# Patient Record
Sex: MALE | Race: BLACK OR AFRICAN AMERICAN | Employment: FULL TIME | URBAN - METROPOLITAN AREA
[De-identification: names, ages, dates, MRNs, and addresses within clinical notes are randomized per-mention and may not be internally consistent; named-entity substitution may affect disease eponyms.]

---

## 2017-06-06 ENCOUNTER — HOSPITAL ENCOUNTER (EMERGENCY)
Age: 37
Discharge: HOME | End: 2017-06-06
Attending: EMERGENCY MEDICINE
Payer: MEDICARE

## 2017-06-06 VITALS
TEMPERATURE: 98.1 F | SYSTOLIC BLOOD PRESSURE: 172 MMHG | RESPIRATION RATE: 18 BRPM | OXYGEN SATURATION: 99 % | DIASTOLIC BLOOD PRESSURE: 107 MMHG | WEIGHT: 190 LBS | HEART RATE: 89 BPM | HEIGHT: 69 IN | BODY MASS INDEX: 28.14 KG/M2

## 2017-06-06 DIAGNOSIS — L73.9 FOLLICULITIS: Primary | ICD-10-CM

## 2017-06-06 PROCEDURE — 99282 EMERGENCY DEPT VISIT SF MDM: CPT

## 2017-06-06 RX ORDER — DOXYCYCLINE HYCLATE 100 MG
100 TABLET ORAL ONCE
Status: DISCONTINUED | OUTPATIENT
Start: 2017-06-06 | End: 2017-06-06

## 2017-06-06 RX ORDER — DOXYCYCLINE HYCLATE 100 MG
200 TABLET ORAL ONCE
Status: DISCONTINUED | OUTPATIENT
Start: 2017-06-06 | End: 2017-06-07 | Stop reason: HOSPADM

## 2017-06-06 RX ORDER — DOXYCYCLINE 100 MG/1
100 TABLET ORAL 2 TIMES DAILY
Qty: 20 TABLET | Refills: 0 | Status: SHIPPED | OUTPATIENT
Start: 2017-06-06 | End: 2017-06-16

## 2017-08-06 ENCOUNTER — HOSPITAL ENCOUNTER (EMERGENCY)
Age: 37
Discharge: HOME | End: 2017-08-06
Attending: EMERGENCY MEDICINE
Payer: MEDICARE

## 2017-08-06 VITALS
SYSTOLIC BLOOD PRESSURE: 163 MMHG | BODY MASS INDEX: 29.62 KG/M2 | RESPIRATION RATE: 16 BRPM | HEART RATE: 85 BPM | OXYGEN SATURATION: 96 % | HEIGHT: 69 IN | TEMPERATURE: 97.5 F | WEIGHT: 200 LBS | DIASTOLIC BLOOD PRESSURE: 105 MMHG

## 2017-08-06 DIAGNOSIS — L20.84 INTRINSIC ECZEMA: Primary | ICD-10-CM

## 2017-08-06 PROCEDURE — 6370000000 HC RX 637 (ALT 250 FOR IP): Performed by: EMERGENCY MEDICINE

## 2017-08-06 PROCEDURE — G0381 LEV 2 HOSP TYPE B ED VISIT: HCPCS

## 2017-08-06 RX ORDER — PREDNISONE 20 MG/1
60 TABLET ORAL ONCE
Status: COMPLETED | OUTPATIENT
Start: 2017-08-06 | End: 2017-08-06

## 2017-08-06 RX ORDER — PREDNISONE 20 MG/1
60 TABLET ORAL DAILY
Qty: 12 TABLET | Refills: 0 | Status: SHIPPED | OUTPATIENT
Start: 2017-08-06 | End: 2017-08-10

## 2017-08-06 RX ADMIN — PREDNISONE 60 MG: 20 TABLET ORAL at 21:08

## 2017-08-06 ASSESSMENT — PAIN DESCRIPTION - LOCATION: LOCATION: GENERALIZED

## 2017-08-06 ASSESSMENT — PAIN DESCRIPTION - PAIN TYPE: TYPE: CHRONIC PAIN

## 2017-08-06 ASSESSMENT — PAIN SCALES - GENERAL: PAINLEVEL_OUTOF10: 9

## 2017-08-06 ASSESSMENT — PAIN DESCRIPTION - FREQUENCY: FREQUENCY: CONTINUOUS

## 2018-02-14 ENCOUNTER — HOSPITAL ENCOUNTER (EMERGENCY)
Age: 38
Discharge: HOME | End: 2018-02-14
Attending: EMERGENCY MEDICINE

## 2018-02-14 ENCOUNTER — APPOINTMENT (OUTPATIENT)
Dept: GENERAL RADIOLOGY | Age: 38
End: 2018-02-14

## 2018-02-14 VITALS
SYSTOLIC BLOOD PRESSURE: 199 MMHG | RESPIRATION RATE: 16 BRPM | HEART RATE: 81 BPM | OXYGEN SATURATION: 98 % | WEIGHT: 185 LBS | DIASTOLIC BLOOD PRESSURE: 118 MMHG | TEMPERATURE: 97.3 F | BODY MASS INDEX: 27.32 KG/M2

## 2018-02-14 DIAGNOSIS — M54.6 ACUTE LEFT-SIDED THORACIC BACK PAIN: Primary | ICD-10-CM

## 2018-02-14 DIAGNOSIS — R52 PAIN: ICD-10-CM

## 2018-02-14 PROCEDURE — 6370000000 HC RX 637 (ALT 250 FOR IP): Performed by: EMERGENCY MEDICINE

## 2018-02-14 PROCEDURE — 96372 THER/PROPH/DIAG INJ SC/IM: CPT

## 2018-02-14 PROCEDURE — 72040 X-RAY EXAM NECK SPINE 2-3 VW: CPT

## 2018-02-14 PROCEDURE — 99283 EMERGENCY DEPT VISIT LOW MDM: CPT

## 2018-02-14 PROCEDURE — 72050 X-RAY EXAM NECK SPINE 4/5VWS: CPT

## 2018-02-14 PROCEDURE — 6360000002 HC RX W HCPCS: Performed by: EMERGENCY MEDICINE

## 2018-02-14 PROCEDURE — 72072 X-RAY EXAM THORAC SPINE 3VWS: CPT

## 2018-02-14 RX ORDER — KETOROLAC TROMETHAMINE 30 MG/ML
30 INJECTION, SOLUTION INTRAMUSCULAR; INTRAVENOUS ONCE
Status: DISCONTINUED | OUTPATIENT
Start: 2018-02-14 | End: 2018-02-14

## 2018-02-14 RX ORDER — IBUPROFEN 800 MG/1
800 TABLET ORAL EVERY 8 HOURS PRN
Qty: 30 TABLET | Refills: 0 | Status: SHIPPED | OUTPATIENT
Start: 2018-02-14 | End: 2020-01-12

## 2018-02-14 RX ORDER — KETOROLAC TROMETHAMINE 30 MG/ML
30 INJECTION, SOLUTION INTRAMUSCULAR; INTRAVENOUS ONCE
Status: COMPLETED | OUTPATIENT
Start: 2018-02-14 | End: 2018-02-14

## 2018-02-14 RX ORDER — LISINOPRIL 10 MG/1
5 TABLET ORAL DAILY
Qty: 15 TABLET | Refills: 0 | Status: SHIPPED | OUTPATIENT
Start: 2018-02-14 | End: 2020-02-03 | Stop reason: SDUPTHER

## 2018-02-14 RX ORDER — DIAZEPAM 5 MG/1
5 TABLET ORAL ONCE
Status: COMPLETED | OUTPATIENT
Start: 2018-02-14 | End: 2018-02-14

## 2018-02-14 RX ORDER — LISINOPRIL 10 MG/1
5 TABLET ORAL ONCE
Status: COMPLETED | OUTPATIENT
Start: 2018-02-14 | End: 2018-02-14

## 2018-02-14 RX ORDER — DIAZEPAM 5 MG/1
5 TABLET ORAL EVERY 8 HOURS PRN
Qty: 10 TABLET | Refills: 0 | Status: SHIPPED | OUTPATIENT
Start: 2018-02-14 | End: 2018-02-24

## 2018-02-14 RX ADMIN — DIAZEPAM 5 MG: 5 TABLET ORAL at 09:15

## 2018-02-14 RX ADMIN — KETOROLAC TROMETHAMINE 30 MG: 30 INJECTION, SOLUTION INTRAMUSCULAR; INTRAVENOUS at 09:24

## 2018-02-14 RX ADMIN — LISINOPRIL 5 MG: 10 TABLET ORAL at 09:33

## 2018-02-14 ASSESSMENT — PAIN SCALES - GENERAL
PAINLEVEL_OUTOF10: 9
PAINLEVEL_OUTOF10: 9

## 2018-02-14 ASSESSMENT — ENCOUNTER SYMPTOMS
COUGH: 0
SORE THROAT: 0
SHORTNESS OF BREATH: 0
COLOR CHANGE: 0
NAUSEA: 0
VOMITING: 0
BACK PAIN: 1
ABDOMINAL PAIN: 0

## 2018-02-14 NOTE — ED PROVIDER NOTES
There is no acute fracture or listhesis. Minimal anterior osteophyte formation is noted within the lower thoracic spine. No acute osseous abnormality or significant degenerative changes of the thoracic spine evident. ED BEDSIDE ULTRASOUND:   Not clinically indicated    LABS:  Labs Reviewed - No data to display    Not clinically indicated    EMERGENCY DEPARTMENT COURSE:   Vitals:    Vitals:    02/14/18 0859   BP: (!) 199/118   Pulse: 81   Resp: 16   Temp: 97.3 °F (36.3 °C)   SpO2: 98%   Weight: 185 lb (83.9 kg)     -------------------------  BP: (!) 199/118, Temp: 97.3 °F (36.3 °C), Pulse: 81, Resp: 16    hypertensive      FINAL IMPRESSION      1. Acute left-sided thoracic back pain          DISPOSITION/PLAN     D/C to home    PATIENT REFERRED TO:  [facility] ED  [address] [place] [address]  [phone]  Go to   As needed      DISCHARGE MEDICATIONS:  New Prescriptions    DIAZEPAM (VALIUM) 5 MG TABLET    Take 1 tablet by mouth every 8 hours as needed for Anxiety for up to 10 days.     IBUPROFEN (ADVIL;MOTRIN) 800 MG TABLET    Take 1 tablet by mouth every 8 hours as needed for Pain    LISINOPRIL (PRINIVIL) 10 MG TABLET    Take 0.5 tablets by mouth daily       (Please note that portions of this note were completed with a voice recognition program.  Efforts were made to edit the dictations but occasionally words are mis-transcribed.)    Cj Quiñonez MD  Attending Emergency Physician                      Cj Quiñonez MD  02/14/18 0753

## 2019-06-18 ENCOUNTER — HOSPITAL ENCOUNTER (EMERGENCY)
Age: 39
Discharge: HOME | End: 2019-06-18
Attending: EMERGENCY MEDICINE
Payer: COMMERCIAL

## 2019-06-18 ENCOUNTER — HOSPITAL ENCOUNTER (EMERGENCY)
Age: 39
Discharge: HOME | End: 2019-06-18
Attending: EMERGENCY MEDICINE

## 2019-06-18 VITALS
BODY MASS INDEX: 30.45 KG/M2 | TEMPERATURE: 98.2 F | OXYGEN SATURATION: 100 % | SYSTOLIC BLOOD PRESSURE: 160 MMHG | RESPIRATION RATE: 16 BRPM | DIASTOLIC BLOOD PRESSURE: 102 MMHG | HEIGHT: 69 IN | WEIGHT: 205.6 LBS | HEART RATE: 73 BPM

## 2019-06-18 DIAGNOSIS — Z53.21 PATIENT LEFT WITHOUT BEING SEEN: Primary | ICD-10-CM

## 2019-06-18 DIAGNOSIS — L30.9 ECZEMA, UNSPECIFIED TYPE: Primary | ICD-10-CM

## 2019-06-18 DIAGNOSIS — R03.0 ELEVATED BLOOD PRESSURE READING: ICD-10-CM

## 2019-06-18 PROCEDURE — 6360000002 HC RX W HCPCS: Performed by: PHYSICIAN ASSISTANT

## 2019-06-18 PROCEDURE — 99282 EMERGENCY DEPT VISIT SF MDM: CPT

## 2019-06-18 PROCEDURE — 96372 THER/PROPH/DIAG INJ SC/IM: CPT

## 2019-06-18 RX ORDER — PREDNISONE 10 MG/1
TABLET ORAL
Qty: 30 TABLET | Refills: 0 | Status: SHIPPED | OUTPATIENT
Start: 2019-06-19 | End: 2019-10-05

## 2019-06-18 RX ORDER — WATER / MINERAL OIL / WHITE PETROLATUM 16 OZ
CREAM TOPICAL
Qty: 1 PACKAGE | Refills: 0 | Status: SHIPPED | OUTPATIENT
Start: 2019-06-18 | End: 2021-04-19

## 2019-06-18 RX ORDER — DEXAMETHASONE SODIUM PHOSPHATE 10 MG/ML
10 INJECTION INTRAMUSCULAR; INTRAVENOUS ONCE
Status: COMPLETED | OUTPATIENT
Start: 2019-06-18 | End: 2019-06-18

## 2019-06-18 RX ADMIN — DEXAMETHASONE SODIUM PHOSPHATE 10 MG: 10 INJECTION INTRAMUSCULAR; INTRAVENOUS at 21:56

## 2019-06-18 ASSESSMENT — PAIN DESCRIPTION - PROGRESSION: CLINICAL_PROGRESSION: GRADUALLY WORSENING

## 2019-06-18 ASSESSMENT — PAIN DESCRIPTION - LOCATION: LOCATION: GENERALIZED;OTHER (COMMENT)

## 2019-06-18 ASSESSMENT — PAIN DESCRIPTION - PAIN TYPE: TYPE: ACUTE PAIN

## 2019-06-18 ASSESSMENT — PAIN SCALES - GENERAL: PAINLEVEL_OUTOF10: 8

## 2019-06-18 ASSESSMENT — PAIN DESCRIPTION - FREQUENCY: FREQUENCY: CONTINUOUS

## 2019-06-19 NOTE — ED NOTES
Patient drove self to ER c/o rash/eczema breakout t/o body. Patient states he used to see a dermatologist, but has not been to one in about 1 year. Patient sitting on bed, no distress noted at this time, call light within reach, and instructed to call out for assistance as needed.       Solange Singletary RN  06/18/19 [id number]

## 2019-06-19 NOTE — ED PROVIDER NOTES
eMERGENCY dEPARTMENT eNCOUnter   Independent Attestation     Pt Name: Luis Fernando Morton  MRN: [id number]  [place] 1980  Date of evaluation: 6/18/19     Luis Fernando Morton is a 45 y.o. male with CC: Pruritis (Generalized skin HX Eczema)      Based on the medical record the care appears appropriate. I was personally available for consultation in the Emergency Department.     Anali Byrd MD  Attending Emergency Physician                    Anali Byrd MD  06/18/19 [id number]

## 2019-06-19 NOTE — ED PROVIDER NOTES
[facility]  Emergency Department  Faculty Attestation     PERTINENT ATTENDING PHYSICIAN COMMENTS:    Did not evaluate the patient. LWBS before before patient roomed.     Lance Anderson MD  Attending Emergency  Physician    (Please note that portions of this note were completed with a voice recognition program.  Efforts were made to edit the dictations but occasionally words are mis-transcribed.)        Fallon Sequeira MD  06/18/19 1985

## 2019-06-19 NOTE — ED PROVIDER NOTES
reports that he does not use drugs. REVIEW OFSYSTEMS    (2-9 systems for level 4, 10 or more for level 5)   Review of Systems    Except as noted above the remainder of the review of systems was reviewed and negative. PHYSICAL EXAM    (up to 7 for level 4, 8 or more for level 5)     ED Triage Vitals   BP Temp Temp src Pulse Resp SpO2 Height Weight   06/18/19 2128 06/18/19 2128 -- 06/18/19 2128 06/18/19 2128 06/18/19 2128 06/18/19 2129 06/18/19 2129   (!) 160/102 98.2 °F (36.8 °C)  73 16 100 % 5' 9\" (1.753 m) 205 lb 9.6 oz (93.3 kg)      Physical Exam   Constitutional: He is oriented to person, place, and time. He appears well-developed. HENT:   Head: Normocephalic and atraumatic. Eyes: EOM are normal.   Neck: Normal range of motion. Neck supple. Cardiovascular: Normal rate and regular rhythm. Pulmonary/Chest: Effort normal and breath sounds normal.   Abdominal: Soft. Musculoskeletal: Normal range of motion. Neurological: He is alert and oriented to person, place, and time. Skin: Skin is warm. Rash ( Rashes consistent with eczema on the back and neck, face, antecubital fossa, behind the knees, legs, waist line) noted. Psychiatric: He has a normal mood and affect. His behavior is normal.               DIAGNOSTIC RESULTS     EKG: All EKG's are interpreted by the Emergency Department Physician who either signs or Co-signs this chart in the absence of a cardiologist.        RADIOLOGY:   Non-plain film images such as CT, Ultrasound and MRI are read by the radiologist. Plain radiographic images arevisualized and preliminarily interpreted by the emergency physician with the below findings:        Interpretation per the Radiologist below, if available at thetime of this note:          ED BEDSIDE ULTRASOUND:   Performed by ED Physician - none    LABS:  Labs Reviewed - No data to display    All other labs were within normal range or not returned as of this dictation.     EMERGENCY DEPARTMENT COURSE and DIFFERENTIAL DIAGNOSIS/MDM:   Vitals:    Vitals:    06/18/19 2128 06/18/19 2129   BP: (!) 160/102    Pulse: 73    Resp: 16    Temp: 98.2 °F (36.8 °C)    SpO2: 100%    Weight:  205 lb 9.6 oz (93.3 kg)   Height:  5' 9\" (1.753 m)     Patient given Decadron discharge home. Given prednisone, Aquaphor, Eucerin cream.  Refer to dermatology for his history of eczema. Pre-hypertension/Hypertension: The patient has been informed that they may have pre-hypertension or Hypertension based on a blood pressure reading in the emergency department. I recommend that the patient call the primary care provider listed on their discharge instructions or a physician of their choice this week to arrange follow up for further evaluation of possible pre-hypertension or Hypertension. CONSULTS:  None    PROCEDURES:  Procedures        FINAL IMPRESSION      1. Eczema, unspecified type    2. Elevated blood pressure reading          DISPOSITION/PLAN   DISPOSITION Decision To Discharge 06/18/2019 09:54:10 PM      PATIENTREFERRED TO:   Elan Parrish MD  [address] [place] [phone]    In 1 day        DISCHARGE MEDICATIONS:     New Prescriptions    MINERAL OIL-HYDROPHILIC PETROLATUM (AQUAPHOR) OINTMENT    Apply topically at night before bed    PREDNISONE (DELTASONE) 10 MG TABLET    Take 5 tablets for 2 days  Take 4 tablets for 2 days  Take 3 tablets for 2 days  Take 2 tablets for 2 days  Take 1 tablets for 2 days    SKIN PROTECTANTS, MISC. (EUCERIN) CREAM    Apply topically twice a day.            (Please note that portions of this note were completed with a voice recognition program.  Efforts were made to edit thedictations but occasionally words are mis-transcribed.)    CHRISTIN Vega PA-C  06/18/19 [id number]

## 2019-06-19 NOTE — ED PROVIDER NOTES
Did not see or evaluate the patient, the patient eloped prior to me seeing him.      Jacque Rick,   06/18/19 9323

## 2019-08-06 ENCOUNTER — HOSPITAL ENCOUNTER (EMERGENCY)
Age: 39
Discharge: HOME | End: 2019-08-06
Attending: EMERGENCY MEDICINE
Payer: COMMERCIAL

## 2019-08-06 VITALS
BODY MASS INDEX: 28.14 KG/M2 | HEART RATE: 87 BPM | OXYGEN SATURATION: 100 % | DIASTOLIC BLOOD PRESSURE: 97 MMHG | RESPIRATION RATE: 18 BRPM | TEMPERATURE: 98.3 F | SYSTOLIC BLOOD PRESSURE: 175 MMHG | HEIGHT: 69 IN | WEIGHT: 190 LBS

## 2019-08-06 DIAGNOSIS — L30.9 ECZEMA, UNSPECIFIED TYPE: Primary | ICD-10-CM

## 2019-08-06 PROCEDURE — 96372 THER/PROPH/DIAG INJ SC/IM: CPT

## 2019-08-06 PROCEDURE — 6360000002 HC RX W HCPCS: Performed by: EMERGENCY MEDICINE

## 2019-08-06 PROCEDURE — 99282 EMERGENCY DEPT VISIT SF MDM: CPT

## 2019-08-06 RX ORDER — PREDNISONE 50 MG/1
50 TABLET ORAL DAILY
Qty: 5 TABLET | Refills: 0 | Status: SHIPPED | OUTPATIENT
Start: 2019-08-06 | End: 2019-08-11

## 2019-08-06 RX ORDER — CLOBETASOL PROPIONATE 0.5 MG/G
OINTMENT TOPICAL
Qty: 60 G | Refills: 3 | Status: SHIPPED | OUTPATIENT
Start: 2019-08-06 | End: 2020-02-03 | Stop reason: SDUPTHER

## 2019-08-06 RX ORDER — DEXAMETHASONE SODIUM PHOSPHATE 10 MG/ML
10 INJECTION INTRAMUSCULAR; INTRAVENOUS ONCE
Status: COMPLETED | OUTPATIENT
Start: 2019-08-06 | End: 2019-08-06

## 2019-08-06 RX ADMIN — DEXAMETHASONE SODIUM PHOSPHATE 10 MG: 10 INJECTION INTRAMUSCULAR; INTRAVENOUS at 20:05

## 2019-08-07 NOTE — ED PROVIDER NOTES
pertinent surgical history. FAMILY HISTORY           Problem Relation Age of Onset    Cancer Mother         colon cancer-  54y/o    Diabetes Maternal Grandmother      Family Status   Relation Name Status    Mother  (Not Specified)    MGM  (Not Specified)        SOCIAL HISTORY      reports that he has been smoking cigars and cigarettes. He has a 10.00 pack-year smoking history. He has never used smokeless tobacco. He reports that he drinks alcohol. He reports that he does not use drugs. REVIEW OF SYSTEMS    (2-9 systems for level 4, 10 or more for level 5)     Review of Systems   All other systems reviewed and are negative. Except as noted above the remainder of the review of systems was reviewed and negative. PHYSICAL EXAM    (up to 7 for level 4, 8 or more for level 5)     Vitals:    19   BP: (!) 175/97   Pulse: 87   Resp: 18   Temp: 98.3 °F (36.8 °C)   TempSrc: Oral   SpO2: 100%   Weight: 190 lb (86.2 kg)   Height: 5' 9\" (1.753 m)       Physical exam reflects a well-nourished well-hydrated male. He was hypertensive on arrival.  It is noted he has not had his blood pressure medications this evening. Vital signs otherwise stable to include pulse ox of 100% on room air. He is not hypoxic. He is alert conversive and appropriate in behavior. Integument demonstrates chronic rash particularly to the lower extremities. He has scarring as well as acute flare consistent with his psoriatic type rash. No open wounds or secondary infection. He does have darkening to the base of the neck although no active rash lesions. His lesions to the elbows. Heart regular rate and rhythm normal S1-S2 no murmurs rubs gallops. Lungs are clear to auscultation without wheezes rales or rhonchi. Abdomen is soft throughout.   No neurovascular deficits are noted      DIAGNOSTIC RESULTS       EMERGENCY DEPARTMENT COURSE and DIFFERENTIAL DIAGNOSIS/MDM:   Vitals:    Vitals:    19   BP: (!)

## 2019-10-05 ENCOUNTER — HOSPITAL ENCOUNTER (EMERGENCY)
Age: 39
Discharge: HOME | End: 2019-10-05
Attending: EMERGENCY MEDICINE
Payer: COMMERCIAL

## 2019-10-05 VITALS
HEIGHT: 69 IN | OXYGEN SATURATION: 100 % | HEART RATE: 85 BPM | DIASTOLIC BLOOD PRESSURE: 112 MMHG | BODY MASS INDEX: 29.62 KG/M2 | SYSTOLIC BLOOD PRESSURE: 168 MMHG | WEIGHT: 200 LBS | TEMPERATURE: 98.2 F | RESPIRATION RATE: 18 BRPM

## 2019-10-05 DIAGNOSIS — Z76.0 ENCOUNTER FOR MEDICATION REFILL: ICD-10-CM

## 2019-10-05 DIAGNOSIS — L30.9 ECZEMA, UNSPECIFIED TYPE: Primary | ICD-10-CM

## 2019-10-05 DIAGNOSIS — Z86.79 HISTORY OF HYPERTENSION: ICD-10-CM

## 2019-10-05 PROCEDURE — 99282 EMERGENCY DEPT VISIT SF MDM: CPT

## 2019-10-05 RX ORDER — AMLODIPINE BESYLATE 2.5 MG/1
2.5 TABLET ORAL DAILY
Qty: 14 TABLET | Refills: 0 | Status: SHIPPED | OUTPATIENT
Start: 2019-10-05 | End: 2020-01-12

## 2019-10-05 RX ORDER — PREDNISONE 10 MG/1
TABLET ORAL
Qty: 20 TABLET | Refills: 0 | Status: SHIPPED | OUTPATIENT
Start: 2019-10-05 | End: 2020-01-12

## 2019-10-05 ASSESSMENT — PAIN DESCRIPTION - PAIN TYPE: TYPE: ACUTE PAIN

## 2019-10-05 ASSESSMENT — ENCOUNTER SYMPTOMS
COUGH: 0
EYE PAIN: 0
SHORTNESS OF BREATH: 0
PHOTOPHOBIA: 0
ABDOMINAL PAIN: 0
COLOR CHANGE: 0

## 2019-10-05 ASSESSMENT — PAIN SCALES - GENERAL: PAINLEVEL_OUTOF10: 10

## 2019-10-05 ASSESSMENT — PAIN DESCRIPTION - ORIENTATION: ORIENTATION: RIGHT;LEFT

## 2019-10-05 ASSESSMENT — PAIN DESCRIPTION - DESCRIPTORS: DESCRIPTORS: BURNING

## 2019-12-24 ENCOUNTER — HOSPITAL ENCOUNTER (EMERGENCY)
Age: 39
Discharge: HOME | End: 2019-12-24
Attending: EMERGENCY MEDICINE
Payer: COMMERCIAL

## 2019-12-24 VITALS
SYSTOLIC BLOOD PRESSURE: 155 MMHG | HEIGHT: 69 IN | OXYGEN SATURATION: 97 % | HEART RATE: 105 BPM | BODY MASS INDEX: 30.36 KG/M2 | DIASTOLIC BLOOD PRESSURE: 103 MMHG | RESPIRATION RATE: 14 BRPM | WEIGHT: 205 LBS | TEMPERATURE: 97.9 F

## 2019-12-24 DIAGNOSIS — M54.50 ACUTE EXACERBATION OF CHRONIC LOW BACK PAIN: Primary | ICD-10-CM

## 2019-12-24 DIAGNOSIS — G89.29 ACUTE EXACERBATION OF CHRONIC LOW BACK PAIN: Primary | ICD-10-CM

## 2019-12-24 PROCEDURE — 99283 EMERGENCY DEPT VISIT LOW MDM: CPT

## 2019-12-24 PROCEDURE — 6360000002 HC RX W HCPCS: Performed by: EMERGENCY MEDICINE

## 2019-12-24 PROCEDURE — 96372 THER/PROPH/DIAG INJ SC/IM: CPT

## 2019-12-24 RX ORDER — KETOROLAC TROMETHAMINE 30 MG/ML
60 INJECTION, SOLUTION INTRAMUSCULAR; INTRAVENOUS ONCE
Status: COMPLETED | OUTPATIENT
Start: 2019-12-24 | End: 2019-12-24

## 2019-12-24 RX ORDER — IBUPROFEN 800 MG/1
800 TABLET ORAL 2 TIMES DAILY PRN
Qty: 25 TABLET | Refills: 1 | Status: SHIPPED | OUTPATIENT
Start: 2019-12-24 | End: 2020-01-12

## 2019-12-24 RX ORDER — PREDNISONE 20 MG/1
60 TABLET ORAL ONCE
Status: DISCONTINUED | OUTPATIENT
Start: 2019-12-24 | End: 2019-12-24 | Stop reason: HOSPADM

## 2019-12-24 RX ORDER — METHYLPREDNISOLONE 4 MG/1
TABLET ORAL
Qty: 1 KIT | Refills: 0 | Status: SHIPPED | OUTPATIENT
Start: 2019-12-24 | End: 2020-01-12

## 2019-12-24 RX ORDER — CYCLOBENZAPRINE HCL 10 MG
10 TABLET ORAL NIGHTLY PRN
Qty: 10 TABLET | Refills: 0 | Status: SHIPPED | OUTPATIENT
Start: 2019-12-24 | End: 2020-01-03

## 2019-12-24 RX ADMIN — KETOROLAC TROMETHAMINE 60 MG: 30 INJECTION, SOLUTION INTRAMUSCULAR at 09:19

## 2019-12-24 ASSESSMENT — ENCOUNTER SYMPTOMS
ABDOMINAL DISTENTION: 0
CHEST TIGHTNESS: 0
EYE PAIN: 0
ABDOMINAL PAIN: 0
EYE DISCHARGE: 0
SHORTNESS OF BREATH: 0
BACK PAIN: 1
FACIAL SWELLING: 0

## 2019-12-24 ASSESSMENT — PAIN SCALES - GENERAL
PAINLEVEL_OUTOF10: 10
PAINLEVEL_OUTOF10: 10

## 2019-12-24 ASSESSMENT — PAIN DESCRIPTION - PAIN TYPE: TYPE: ACUTE PAIN

## 2020-01-12 ENCOUNTER — HOSPITAL ENCOUNTER (EMERGENCY)
Age: 40
Discharge: HOME | End: 2020-01-12
Attending: EMERGENCY MEDICINE
Payer: COMMERCIAL

## 2020-01-12 VITALS
SYSTOLIC BLOOD PRESSURE: 178 MMHG | WEIGHT: 210 LBS | DIASTOLIC BLOOD PRESSURE: 110 MMHG | TEMPERATURE: 98 F | BODY MASS INDEX: 31.1 KG/M2 | HEART RATE: 95 BPM | RESPIRATION RATE: 20 BRPM | HEIGHT: 69 IN | OXYGEN SATURATION: 96 %

## 2020-01-12 PROCEDURE — 6360000002 HC RX W HCPCS: Performed by: PHYSICIAN ASSISTANT

## 2020-01-12 PROCEDURE — 99283 EMERGENCY DEPT VISIT LOW MDM: CPT

## 2020-01-12 PROCEDURE — 96372 THER/PROPH/DIAG INJ SC/IM: CPT

## 2020-01-12 RX ORDER — NAPROXEN 500 MG/1
500 TABLET ORAL 2 TIMES DAILY WITH MEALS
Qty: 20 TABLET | Refills: 0 | Status: SHIPPED | OUTPATIENT
Start: 2020-01-12 | End: 2020-01-12

## 2020-01-12 RX ORDER — METHOCARBAMOL 750 MG/1
750 TABLET, FILM COATED ORAL 4 TIMES DAILY
Qty: 40 TABLET | Refills: 0 | Status: SHIPPED | OUTPATIENT
Start: 2020-01-12 | End: 2020-01-22

## 2020-01-12 RX ORDER — KETOROLAC TROMETHAMINE 30 MG/ML
60 INJECTION, SOLUTION INTRAMUSCULAR; INTRAVENOUS ONCE
Status: COMPLETED | OUTPATIENT
Start: 2020-01-12 | End: 2020-01-12

## 2020-01-12 RX ORDER — PREDNISONE 20 MG/1
20 TABLET ORAL 2 TIMES DAILY
Qty: 10 TABLET | Refills: 0 | Status: SHIPPED | OUTPATIENT
Start: 2020-01-12 | End: 2020-01-17

## 2020-01-12 RX ORDER — ORPHENADRINE CITRATE 30 MG/ML
60 INJECTION INTRAMUSCULAR; INTRAVENOUS ONCE
Status: COMPLETED | OUTPATIENT
Start: 2020-01-12 | End: 2020-01-12

## 2020-01-12 RX ADMIN — KETOROLAC TROMETHAMINE 60 MG: 30 INJECTION, SOLUTION INTRAMUSCULAR at 23:40

## 2020-01-12 RX ADMIN — ORPHENADRINE CITRATE 60 MG: 30 INJECTION INTRAMUSCULAR; INTRAVENOUS at 23:40

## 2020-01-12 ASSESSMENT — PAIN SCALES - GENERAL
PAINLEVEL_OUTOF10: 10
PAINLEVEL_OUTOF10: 10

## 2020-01-12 ASSESSMENT — PAIN DESCRIPTION - LOCATION: LOCATION: BACK

## 2020-01-12 ASSESSMENT — PAIN DESCRIPTION - DESCRIPTORS: DESCRIPTORS: BURNING;STABBING;ACHING

## 2020-01-13 NOTE — ED PROVIDER NOTES
other labs were within normal range or not returned as of this dictation. EMERGENCY DEPARTMENT COURSE and DIFFERENTIAL DIAGNOSIS/MDM:   Vitals:    Vitals:    01/12/20 2250   BP: (!) 178/110   Pulse: 95   Resp: 20   Temp: 98 °F (36.7 °C)   TempSrc: Oral   SpO2: 96%   Weight: 210 lb (95.3 kg)   Height: 5' 9\" (1.753 m)     X-rays discussed with patient declined at this time. Patient given Toradol and Norflex and discharged home with work note, prednisone and Robaxin. Patient requesting prednisone. Pre-hypertension/Hypertension: The patient has been informed that they may have pre-hypertension or Hypertension based on a blood pressure reading in the emergency department. I recommend that the patient call the primary care provider listed on their discharge instructions or a physician of their choice this week to arrange follow up for further evaluation of possible pre-hypertension or Hypertension. CONSULTS:  None    PROCEDURES:  Procedures        FINAL IMPRESSION      1. Strain of lumbar region, initial encounter    2. Elevated blood pressure reading          DISPOSITION/PLAN   DISPOSITION Decision To Discharge 01/12/2020 11:35:44 PM      PATIENTREFERRED TO:   No follow-up provider specified.     DISCHARGE MEDICATIONS:     Discharge Medication List as of 1/12/2020 11:47 PM      START taking these medications    Details   methocarbamol (ROBAXIN-750) 750 MG tablet Take 1 tablet by mouth 4 times daily for 10 days, Disp-40 tablet, R-0Print                 (Please note that portions of this note were completed with a voice recognition program.  Efforts were made to edit thedictations but occasionally words are mis-transcribed.)    CHRISTIN [place] PA-C  01/12/20 Lynnette Cantu PA-C  01/13/20 [phone]

## 2020-01-13 NOTE — ED TRIAGE NOTES
Arrives ambulatory with slow gait, c/o low back pain in coccyx area, increased pain with position change. 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

## 2020-01-13 NOTE — ED NOTES
Pt presents to the ED via private auto for lower back pain. Pt state states the pain has been getting worse since Friday 1/10. Pt states the only thing different is that he has been lifting heavy packages. Pt states the pain is a 10/10. Pt has been taking muscle relaxers at home with no relief.       Chapin Gerard RN  01/12/20 7412

## 2020-02-03 ENCOUNTER — OFFICE VISIT (OUTPATIENT)
Dept: FAMILY MEDICINE CLINIC | Age: 40
End: 2020-02-03
Payer: COMMERCIAL

## 2020-02-03 VITALS
BODY MASS INDEX: 31.84 KG/M2 | DIASTOLIC BLOOD PRESSURE: 93 MMHG | HEIGHT: 69 IN | SYSTOLIC BLOOD PRESSURE: 145 MMHG | WEIGHT: 215 LBS | HEART RATE: 85 BPM

## 2020-02-03 PROCEDURE — 99211 OFF/OP EST MAY X REQ PHY/QHP: CPT

## 2020-02-03 PROCEDURE — 99213 OFFICE O/P EST LOW 20 MIN: CPT

## 2020-02-03 RX ORDER — CLOBETASOL PROPIONATE 0.5 MG/G
OINTMENT TOPICAL
Qty: 60 G | Refills: 3 | Status: SHIPPED | OUTPATIENT
Start: 2020-02-03 | End: 2021-04-19

## 2020-02-03 RX ORDER — AMLODIPINE BESYLATE 5 MG/1
5 TABLET ORAL DAILY
Qty: 90 TABLET | Refills: 2 | Status: SHIPPED | OUTPATIENT
Start: 2020-02-03 | End: 2020-11-23 | Stop reason: SDUPTHER

## 2020-02-03 RX ORDER — IBUPROFEN 600 MG/1
600 TABLET ORAL 4 TIMES DAILY PRN
Qty: 360 TABLET | Refills: 1 | OUTPATIENT
Start: 2020-02-03 | End: 2021-03-01

## 2020-02-03 RX ORDER — LISINOPRIL 10 MG/1
20 TABLET ORAL DAILY
Qty: 60 TABLET | Refills: 1 | Status: SHIPPED | OUTPATIENT
Start: 2020-02-03 | End: 2020-11-23 | Stop reason: SDUPTHER

## 2020-02-03 ASSESSMENT — ENCOUNTER SYMPTOMS
BACK PAIN: 1
COLOR CHANGE: 1
APNEA: 0
ABDOMINAL PAIN: 0
PHOTOPHOBIA: 0
VOMITING: 0
CONSTIPATION: 0
CHOKING: 0
WHEEZING: 0
NAUSEA: 0
DIARRHEA: 0
COUGH: 0
SHORTNESS OF BREATH: 0

## 2020-02-03 ASSESSMENT — PATIENT HEALTH QUESTIONNAIRE - PHQ9
SUM OF ALL RESPONSES TO PHQ QUESTIONS 1-9: 0
1. LITTLE INTEREST OR PLEASURE IN DOING THINGS: 0
SUM OF ALL RESPONSES TO PHQ9 QUESTIONS 1 & 2: 0
SUM OF ALL RESPONSES TO PHQ QUESTIONS 1-9: 0
2. FEELING DOWN, DEPRESSED OR HOPELESS: 0

## 2020-02-03 NOTE — PROGRESS NOTES
Attending Physician Statement  I have discussed the care of Geraldine Brumfield, 44 y.o. male,including pertinent history and exam findings,  with the resident Dr. Jerrod Riddle MD.  History:  Chief Complaint   Patient presents with    New Patient     pt. here to est care and HTN     Hypertension    Lower Back Pain    Rash     Patient is here to establish care. Past medical history is remarkable for hypertension, back pain and eczema. I have reviewed the key elements of the encounter with the resident. Examination was done by resident as documented in residents note. BP Readings from Last 3 Encounters:   02/03/20 (!) 145/93   01/12/20 (!) 178/110   12/24/19 (!) 155/103     BP (!) 145/93 (Site: Left Upper Arm, Position: Sitting, Cuff Size: Large Adult)   Pulse 85   Ht 5' 9\" (1.753 m)   Wt 215 lb (97.5 kg)   BMI 31.75 kg/m²   Lab Results   Component Value Date    WBC 13.1 (H) 12/06/2013    HGB 14.4 12/06/2013    HCT 42.3 12/06/2013     12/06/2013    ALT 22 12/06/2013    AST 22 12/06/2013     12/06/2013    K 3.9 12/06/2013     (H) 12/06/2013    CREATININE 0.87 12/06/2013    BUN 11 12/06/2013    CO2 27 12/06/2013     Lab Results   Component Value Date    CALCIUM 9.7 12/06/2013    PHOS 3.9 10/07/2011     No results found for: LDLCALC, LDLCHOLESTEROL, LDLDIRECT  I agree with the assessment, plan and diagnosis of    Diagnosis Orders   1. Essential hypertension  amLODIPine (NORVASC) 5 MG tablet    lisinopril (PRINIVIL) 10 MG tablet   2. Eczema, unspecified type  clobetasol (TEMOVATE) 0.05 % ointment    ADOLFO - Nilsa Villanueva MD, Dermatology, [place]   3. Lumbar herniated disc  ibuprofen (ADVIL;MOTRIN) 600 MG tablet   4. Diabetes mellitus screening  Microalbumin, Ur    Hemoglobin A1C   5. Screening for HIV (human immunodeficiency virus)  HIV Screen   6. Lipid screening  Lipid, Fasting     I agree with  orders as documented by the resident.   Recommendations:   Patient was counseled, medications regimen reviewed and Amlodipine dose increased, refills provided for Amlodipine and Lisinopril and follow up recommended. Health maintenance updated including lipid panel, BMP, urine for micro albumin and HIV screen. Management of back pain and eczema discussed and referral provided to dermatology. Return in about 1 month (around 3/3/2020) for HTN, lab work, back pain f/u.    ([address] ) Dr. Fidelia Morales MD

## 2020-02-03 NOTE — PROGRESS NOTES
Subjective:    Valentino Fava is a 44 y.o. male with  has a past medical history of Back pain, Diverticulitis, Eczema, HTN (hypertension), and Psoriasis. Presented to the office today for:  Chief Complaint   Patient presents with    New Patient     pt. here to est care and HTN     Hypertension    Lower Back Pain    Rash       HPI  80-year-old male came to the clinic to establish care. Patient has a past medical history of essential hypertension, eczema, herniated lumbar disc with back pain. Today patient's blood pressure is 145/93 on repeat. He is currently on lisinopril and Norvasc 2.5 I have increased his dose to 5 mg daily for the Norvasc and told the patient to keep a log of his blood pressure reading. For his eczema I refilled his clobetasol and gave him referral for dermatology. For the back pain I prescribed NSAIDs and will give him some information regarding physical therapy. I would also feel his FM LA form and I talked to the patient regarding that this has to be short-term in the meantime we can have him work with physical therapy and see how things go. Review of Systems   Constitutional: Negative for activity change, appetite change, chills, diaphoresis and fatigue. HENT: Negative for congestion. Eyes: Negative for photophobia and visual disturbance. Respiratory: Negative for apnea, cough, choking, shortness of breath and wheezing. Cardiovascular: Negative for chest pain, palpitations and leg swelling. Gastrointestinal: Negative for abdominal pain, constipation, diarrhea, nausea and vomiting. Endocrine: Negative for polyuria. Genitourinary: Negative for dysuria and hematuria. Musculoskeletal: Positive for back pain. Skin: Positive for color change and rash. Neurological: Negative for dizziness and weakness. Psychiatric/Behavioral: Negative for agitation, behavioral problems and confusion.                  The patient has a   Family History   Problem Relation 112 (H) 12/06/2013    CREATININE 0.87 12/06/2013    BUN 11 12/06/2013    CO2 27 12/06/2013     Lab Results   Component Value Date    CALCIUM 9.7 12/06/2013    PHOS 3.9 10/07/2011     No results found for: LDLCALC, LDLCHOLESTEROL, LDLDIRECT    Assessment and Plan:    1. Essential hypertension  - amLODIPine (NORVASC) 5 MG tablet; Take 1 tablet by mouth daily  Dispense: 90 tablet; Refill: 2  - lisinopril (PRINIVIL) 10 MG tablet; Take 2 tablets by mouth daily  Dispense: 60 tablet; Refill: 1    2. Eczema, unspecified type  - clobetasol (TEMOVATE) 0.05 % ointment; Apply topically 2 times daily. Dispense: 60 g; Refill: 3  - AFL - Mu Glies MD, Dermatology, [facility]    3. Lumbar herniated disc  - ibuprofen (ADVIL;MOTRIN) 600 MG tablet; Take 1 tablet by mouth 4 times daily as needed for Pain  Dispense: 360 tablet; Refill: 1    4. Diabetes mellitus screening  - Microalbumin, Ur; Future  - Hemoglobin A1C; Future    5. Screening for HIV (human immunodeficiency virus)  - HIV Screen; Future    6. Lipid screening  - Lipid, Fasting; Future          Requested Prescriptions     Signed Prescriptions Disp Refills    amLODIPine (NORVASC) 5 MG tablet 90 tablet 2     Sig: Take 1 tablet by mouth daily    clobetasol (TEMOVATE) 0.05 % ointment 60 g 3     Sig: Apply topically 2 times daily.  lisinopril (PRINIVIL) 10 MG tablet 60 tablet 1     Sig: Take 2 tablets by mouth daily    ibuprofen (ADVIL;MOTRIN) 600 MG tablet 360 tablet 1     Sig: Take 1 tablet by mouth 4 times daily as needed for Pain       Medications Discontinued During This Encounter   Medication Reason    amLODIPine (NORVASC) 2.5 MG tablet DOSE ADJUSTMENT    clobetasol (TEMOVATE) 0.05 % ointment REORDER    lisinopril (PRINIVIL) 10 MG tablet REORDER       Dinesh received counseling on the following healthy behaviors: nutrition, exercise and medication adherence    Discussed use,benefit, and side effects of prescribed medications.   Barriers to medication compliance addressed. All patient questions answered. Pt voiced understanding. No follow-ups on file. Disclaimer: Some orall of this note was transcribed using voice-recognition software. This may cause typographical errors occasionally. Although all effort is made to fix these errors, please do not hesitate to contact our office if there Tono Shaffer concern with the understanding of this note.

## 2020-06-11 ENCOUNTER — HOSPITAL ENCOUNTER (EMERGENCY)
Age: 40
Discharge: HOME | End: 2020-06-11
Attending: EMERGENCY MEDICINE
Payer: COMMERCIAL

## 2020-06-11 VITALS
RESPIRATION RATE: 16 BRPM | HEIGHT: 67 IN | TEMPERATURE: 98.7 F | DIASTOLIC BLOOD PRESSURE: 87 MMHG | SYSTOLIC BLOOD PRESSURE: 188 MMHG | OXYGEN SATURATION: 99 % | BODY MASS INDEX: 34.46 KG/M2 | WEIGHT: 219.56 LBS | HEART RATE: 82 BPM

## 2020-06-11 LAB
ABSOLUTE EOS #: 0.18 K/UL (ref 0–0.44)
ABSOLUTE IMMATURE GRANULOCYTE: 0.04 K/UL (ref 0–0.3)
ABSOLUTE LYMPH #: 2.69 K/UL (ref 1.1–3.7)
ABSOLUTE MONO #: 0.62 K/UL (ref 0.1–1.2)
ALBUMIN SERPL-MCNC: 4.2 G/DL (ref 3.5–5.2)
ALBUMIN/GLOBULIN RATIO: ABNORMAL (ref 1–2.5)
ALP BLD-CCNC: 85 U/L (ref 40–129)
ALT SERPL-CCNC: 21 U/L (ref 5–41)
AMYLASE: 68 U/L (ref 28–100)
ANION GAP SERPL CALCULATED.3IONS-SCNC: 13 MMOL/L (ref 9–17)
AST SERPL-CCNC: 23 U/L
BASOPHILS # BLD: 0 % (ref 0–2)
BASOPHILS ABSOLUTE: 0.04 K/UL (ref 0–0.2)
BILIRUB SERPL-MCNC: 0.12 MG/DL (ref 0.3–1.2)
BILIRUBIN DIRECT: <0.08 MG/DL
BILIRUBIN, INDIRECT: ABNORMAL MG/DL (ref 0–1)
BUN BLDV-MCNC: 10 MG/DL (ref 6–20)
BUN/CREAT BLD: 14 (ref 9–20)
CALCIUM SERPL-MCNC: 9.5 MG/DL (ref 8.6–10.4)
CHLORIDE BLD-SCNC: 105 MMOL/L (ref 98–107)
CO2: 23 MMOL/L (ref 20–31)
CREAT SERPL-MCNC: 0.74 MG/DL (ref 0.7–1.2)
DIFFERENTIAL TYPE: ABNORMAL
EOSINOPHILS RELATIVE PERCENT: 2 % (ref 1–4)
GFR AFRICAN AMERICAN: >60 ML/MIN
GFR NON-AFRICAN AMERICAN: >60 ML/MIN
GFR SERPL CREATININE-BSD FRML MDRD: NORMAL ML/MIN/{1.73_M2}
GFR SERPL CREATININE-BSD FRML MDRD: NORMAL ML/MIN/{1.73_M2}
GLOBULIN: ABNORMAL G/DL (ref 1.5–3.8)
GLUCOSE BLD-MCNC: 90 MG/DL (ref 70–99)
HCT VFR BLD CALC: 43 % (ref 40.7–50.3)
HEMOGLOBIN: 14.2 G/DL (ref 13–17)
IMMATURE GRANULOCYTES: 0 %
LIPASE: 29 U/L (ref 13–60)
LYMPHOCYTES # BLD: 22 % (ref 24–43)
MCH RBC QN AUTO: 30.9 PG (ref 25.2–33.5)
MCHC RBC AUTO-ENTMCNC: 33 G/DL (ref 28.4–34.8)
MCV RBC AUTO: 93.5 FL (ref 82.6–102.9)
MONOCYTES # BLD: 5 % (ref 3–12)
NRBC AUTOMATED: 0 PER 100 WBC
PDW BLD-RTO: 12.8 % (ref 11.8–14.4)
PLATELET # BLD: 264 K/UL (ref 138–453)
PLATELET ESTIMATE: ABNORMAL
PMV BLD AUTO: 10.2 FL (ref 8.1–13.5)
POTASSIUM SERPL-SCNC: 3.7 MMOL/L (ref 3.7–5.3)
RBC # BLD: 4.6 M/UL (ref 4.21–5.77)
RBC # BLD: ABNORMAL 10*6/UL
SEG NEUTROPHILS: 71 % (ref 36–65)
SEGMENTED NEUTROPHILS ABSOLUTE COUNT: 8.77 K/UL (ref 1.5–8.1)
SODIUM BLD-SCNC: 141 MMOL/L (ref 135–144)
TOTAL PROTEIN: 7.3 G/DL (ref 6.4–8.3)
WBC # BLD: 12.3 K/UL (ref 3.5–11.3)
WBC # BLD: ABNORMAL 10*3/UL

## 2020-06-11 PROCEDURE — 85025 COMPLETE CBC W/AUTO DIFF WBC: CPT

## 2020-06-11 PROCEDURE — 80076 HEPATIC FUNCTION PANEL: CPT

## 2020-06-11 PROCEDURE — 80048 BASIC METABOLIC PNL TOTAL CA: CPT

## 2020-06-11 PROCEDURE — 83690 ASSAY OF LIPASE: CPT

## 2020-06-11 PROCEDURE — 96374 THER/PROPH/DIAG INJ IV PUSH: CPT

## 2020-06-11 PROCEDURE — 6360000002 HC RX W HCPCS: Performed by: NURSE PRACTITIONER

## 2020-06-11 PROCEDURE — C9113 INJ PANTOPRAZOLE SODIUM, VIA: HCPCS | Performed by: NURSE PRACTITIONER

## 2020-06-11 PROCEDURE — 82150 ASSAY OF AMYLASE: CPT

## 2020-06-11 PROCEDURE — 2580000003 HC RX 258: Performed by: NURSE PRACTITIONER

## 2020-06-11 PROCEDURE — 99284 EMERGENCY DEPT VISIT MOD MDM: CPT

## 2020-06-11 RX ORDER — ONDANSETRON 2 MG/ML
4 INJECTION INTRAMUSCULAR; INTRAVENOUS ONCE
Status: COMPLETED | OUTPATIENT
Start: 2020-06-11 | End: 2020-06-11

## 2020-06-11 RX ORDER — PANTOPRAZOLE SODIUM 40 MG/10ML
40 INJECTION, POWDER, LYOPHILIZED, FOR SOLUTION INTRAVENOUS DAILY
Status: DISCONTINUED | OUTPATIENT
Start: 2020-06-11 | End: 2020-06-12 | Stop reason: HOSPADM

## 2020-06-11 RX ORDER — ONDANSETRON 4 MG/1
4 TABLET, ORALLY DISINTEGRATING ORAL 3 TIMES DAILY PRN
Qty: 21 TABLET | Refills: 0 | Status: SHIPPED | OUTPATIENT
Start: 2020-06-11 | End: 2020-08-20

## 2020-06-11 RX ORDER — 0.9 % SODIUM CHLORIDE 0.9 %
1000 INTRAVENOUS SOLUTION INTRAVENOUS ONCE
Status: COMPLETED | OUTPATIENT
Start: 2020-06-11 | End: 2020-06-11

## 2020-06-11 RX ADMIN — ONDANSETRON 4 MG: 2 INJECTION INTRAMUSCULAR; INTRAVENOUS at 20:51

## 2020-06-11 RX ADMIN — SODIUM CHLORIDE 1000 ML: 9 INJECTION, SOLUTION INTRAVENOUS at 20:51

## 2020-06-11 RX ADMIN — PANTOPRAZOLE SODIUM 40 MG: 40 INJECTION, POWDER, FOR SOLUTION INTRAVENOUS at 20:51

## 2020-06-11 ASSESSMENT — ENCOUNTER SYMPTOMS
VOMITING: 0
COUGH: 0
NAUSEA: 0
WHEEZING: 0
ABDOMINAL PAIN: 1
CONSTIPATION: 0
SORE THROAT: 0
SINUS PRESSURE: 0
COLOR CHANGE: 0
DIARRHEA: 0
RHINORRHEA: 0
SHORTNESS OF BREATH: 0

## 2020-06-11 ASSESSMENT — PAIN SCALES - GENERAL: PAINLEVEL_OUTOF10: 7

## 2020-06-11 NOTE — ED PROVIDER NOTES
EMERGENCY DEPARTMENT ENCOUNTER   ATTENDING ATTESTATION     Pt Name: Summer Hendricks  MRN: [id number]  [place] 1980  Date of evaluation: 6/11/20   Summer Hendricks is a 44 y.o. male with CC: Abdominal Pain (since tuesday); Emesis; and Diarrhea    MDM:   Patient is a 66-year-old male who presented to emergency department secondary to abdominal pain with nausea and vomiting. No acute findings laboratory analysis, discharged home with outpatient follow-up and given parameters to return to the emergency department. CRITICAL CARE:       EKG: All EKG's are interpreted by the Emergency Department Physician who either signs or Co-signs this chart in the absence of a cardiologist.      RADIOLOGY:All plain film, CT, MRI, and formal ultrasound images (except ED bedside ultrasound) are read by the radiologist, see reports below, unless otherwise noted in MDM or here. No orders to display     LABS: All lab results were reviewed by myself, and all abnormals are listed below. Labs Reviewed   CBC WITH AUTO DIFFERENTIAL - Abnormal; Notable for the following components:       Result Value    WBC 12.3 (*)     Seg Neutrophils 71 (*)     Lymphocytes 22 (*)     Segs Absolute 8.77 (*)     All other components within normal limits   HEPATIC FUNCTION PANEL - Abnormal; Notable for the following components: Total Bilirubin 0.12 (*)     All other components within normal limits   BASIC METABOLIC PANEL   LIPASE   AMYLASE     CONSULTS:  None  FINAL IMPRESSION    No diagnosis found. PASTMEDICAL HISTORY     Past Medical History:   Diagnosis Date    Back pain     on 12/24/19 pt denies pain mgmnt    Diverticulitis     Eczema     HTN (hypertension)     Psoriasis      SURGICAL HISTORY     History reviewed. No pertinent surgical history. CURRENT MEDICATIONS       Previous Medications    AMLODIPINE (NORVASC) 5 MG TABLET    Take 1 tablet by mouth daily    CLOBETASOL (TEMOVATE) 0.05 % OINTMENT    Apply topically 2 times daily. IBUPROFEN (ADVIL;MOTRIN) 600 MG TABLET    Take 1 tablet by mouth 4 times daily as needed for Pain    LISINOPRIL (PRINIVIL) 10 MG TABLET    Take 2 tablets by mouth daily    MINERAL OIL-HYDROPHILIC PETROLATUM (AQUAPHOR) OINTMENT    Apply topically at night before bed    SKIN PROTECTANTS, MISC. (EUCERIN) CREAM    Apply topically twice a day. ALLERGIES     has No Known Allergies. FAMILY HISTORY     He indicated that the status of his mother is unknown. He indicated that the status of his maternal grandmother is unknown. SOCIAL HISTORY       Social History     Tobacco Use    Smoking status: Current Every Day Smoker     Packs/day: 0.50     Years: 20.00     Pack years: 10.00     Types: Cigars, Cigarettes    Smokeless tobacco: Never Used   Substance Use Topics    Alcohol use: Yes     Comment: occasionally    Drug use: No       I personally evaluated and examined the patient in conjunction with the APC and agree with the assessment, treatment plan, and disposition of the patient as recorded by the APC.    Kj Maldonado MD  Attending Emergency Physician          Kj Maldonado MD  28/58/47 8783

## 2020-06-12 NOTE — ED PROVIDER NOTES
[facility] ED  eMERGENCY dEPARTMENT eNCOUnter      Pt Name: Fracisco Reinoso  MRN: [id number]  [place] 1980  Date of evaluation: 6/11/2020  Provider: Tiffanie Herrera NP, GIANNA Venegas 4024       Chief Complaint   Patient presents with    Abdominal Pain     since tuesday    Emesis    Diarrhea         HISTORY OF PRESENT ILLNESS  (Location/Symptom, Timing/Onset, Context/Setting, Quality, Duration, Modifying Factors, Severity.)   Fracisco Reinoso is a 44 y.o. male who presents to the emergency department by private vehicle for evaluation of nausea vomiting and diarrhea. Patient states that since Tuesday he has been experiencing epigastric abdominal pain with vomiting and diarrhea. He denies any hematemesis hematochezia. He denies any history of any GI or  problems in the past.  He denies fevers but has had some chills. States that he is even vomited a few times today. Nursing Notes were reviewed. ALLERGIES     Patient has no known allergies. CURRENT MEDICATIONS       Discharge Medication List as of 6/11/2020  9:51 PM      CONTINUE these medications which have NOT CHANGED    Details   amLODIPine (NORVASC) 5 MG tablet Take 1 tablet by mouth daily, Disp-90 tablet, R-2Normal      clobetasol (TEMOVATE) 0.05 % ointment Apply topically 2 times daily. , Disp-60 g, R-3, Normal      lisinopril (PRINIVIL) 10 MG tablet Take 2 tablets by mouth daily, Disp-60 tablet, R-1Normal      ibuprofen (ADVIL;MOTRIN) 600 MG tablet Take 1 tablet by mouth 4 times daily as needed for Pain, Disp-360 tablet, R-1Normal      Skin Protectants, Misc.  (EUCERIN) cream Apply topically twice a day., Disp-1 Package, R-0, Print      mineral oil-hydrophilic petrolatum (AQUAPHOR) ointment Apply topically at night before bed, Disp-396 g, R-0, Print             PAST MEDICAL HISTORY         Diagnosis Date    Back pain     on 12/24/19 pt denies pain mgmnt    Diverticulitis     Eczema     HTN (hypertension)     Psoriasis motion and neck supple. Cardiovascular:      Rate and Rhythm: Normal rate and regular rhythm. Pulmonary:      Effort: Pulmonary effort is normal. No respiratory distress. Breath sounds: Normal breath sounds. No stridor. Abdominal:      General: Bowel sounds are normal.      Palpations: Abdomen is soft. Tenderness: There is abdominal tenderness. Musculoskeletal: Normal range of motion. Lymphadenopathy:      Cervical: No cervical adenopathy. Skin:     General: Skin is warm and dry. Findings: No rash. Neurological:      Mental Status: He is alert and oriented to person, place, and time. LABS:  Labs Reviewed   CBC WITH AUTO DIFFERENTIAL - Abnormal; Notable for the following components:       Result Value    WBC 12.3 (*)     Seg Neutrophils 71 (*)     Lymphocytes 22 (*)     Segs Absolute 8.77 (*)     All other components within normal limits   HEPATIC FUNCTION PANEL - Abnormal; Notable for the following components: Total Bilirubin 0.12 (*)     All other components within normal limits   BASIC METABOLIC PANEL   LIPASE   AMYLASE       All other labs were within normal range or not returned as of this dictation. EMERGENCY DEPARTMENT COURSE and DIFFERENTIAL DIAGNOSIS/MDM:   Vitals:    Vitals:    06/11/20 1921   BP: (!) 188/87   Pulse: 82   Resp: 16   Temp: 98.7 °F (37.1 °C)   TempSrc: Oral   SpO2: 99%   Weight: 219 lb 9 oz (99.6 kg)   Height: 5' 7\" (1.702 m)       Medical Decision Making: Patient feels much better after IV fluids and medications. He is able be discharged home on oral Zofran. He was told to clear liquid diet for the next 24 hours. Follow-up with primary care physician. Return for worsening symptoms.   Medications   pantoprazole (PROTONIX) injection 40 mg (40 mg Intravenous Given 6/11/20 2051)   0.9 % sodium chloride bolus (0 mLs Intravenous Stopped 6/11/20 2151)   ondansetron (ZOFRAN) injection 4 mg (4 mg Intravenous Given 6/11/20 2051)     FINAL IMPRESSION

## 2020-08-20 ENCOUNTER — HOSPITAL ENCOUNTER (EMERGENCY)
Age: 40
Discharge: HOME | End: 2020-08-20
Attending: EMERGENCY MEDICINE
Payer: COMMERCIAL

## 2020-08-20 VITALS
DIASTOLIC BLOOD PRESSURE: 108 MMHG | OXYGEN SATURATION: 98 % | TEMPERATURE: 98.1 F | SYSTOLIC BLOOD PRESSURE: 172 MMHG | HEART RATE: 81 BPM | RESPIRATION RATE: 16 BRPM

## 2020-08-20 PROCEDURE — 6360000002 HC RX W HCPCS: Performed by: EMERGENCY MEDICINE

## 2020-08-20 PROCEDURE — 99282 EMERGENCY DEPT VISIT SF MDM: CPT

## 2020-08-20 PROCEDURE — 96372 THER/PROPH/DIAG INJ SC/IM: CPT

## 2020-08-20 RX ORDER — DEXAMETHASONE SODIUM PHOSPHATE 10 MG/ML
10 INJECTION INTRAMUSCULAR; INTRAVENOUS ONCE
Status: COMPLETED | OUTPATIENT
Start: 2020-08-20 | End: 2020-08-20

## 2020-08-20 RX ORDER — PREDNISONE 10 MG/1
TABLET ORAL
Qty: 30 TABLET | Refills: 0 | Status: SHIPPED | OUTPATIENT
Start: 2020-08-20 | End: 2020-10-25

## 2020-08-20 RX ADMIN — DEXAMETHASONE SODIUM PHOSPHATE 10 MG: 10 INJECTION INTRAMUSCULAR; INTRAVENOUS at 14:02

## 2020-08-20 ASSESSMENT — ENCOUNTER SYMPTOMS
ABDOMINAL PAIN: 0
DIARRHEA: 0
SHORTNESS OF BREATH: 0
EYE DISCHARGE: 0
COLOR CHANGE: 0
FACIAL SWELLING: 0
EYE REDNESS: 0
VOMITING: 0
CONSTIPATION: 0
COUGH: 0

## 2020-08-20 NOTE — ED PROVIDER NOTES
current alcohol use. He reports that he does not use drugs. REVIEW OF SYSTEMS    (2-9 systems for level 4, 10 or more for level 5)     Review of Systems   Constitutional: Negative for chills, fatigue and fever. HENT: Negative for congestion, ear discharge and facial swelling. Eyes: Negative for discharge and redness. Respiratory: Negative for cough and shortness of breath. Cardiovascular: Negative for chest pain. Gastrointestinal: Negative for abdominal pain, constipation, diarrhea and vomiting. Genitourinary: Negative for dysuria and hematuria. Musculoskeletal: Negative for arthralgias. Skin: Positive for rash. Negative for color change. Neurological: Negative for syncope, numbness and headaches. Hematological: Negative for adenopathy. Psychiatric/Behavioral: Negative for confusion. The patient is not nervous/anxious. Except as noted above the remainder of the review of systems was reviewed and negative. PHYSICAL EXAM    (up to 7 for level 4, 8 or more for level 5)     Vitals:    08/20/20 1334   BP: (!) 172/108   Pulse: 81   Resp: 16   Temp: 98.1 °F (36.7 °C)   TempSrc: Oral   SpO2: 98%       Physical Exam  Constitutional:       General: He is not in acute distress. Appearance: He is well-developed. He is not diaphoretic. HENT:      Head: Normocephalic and atraumatic. Eyes:      General: No scleral icterus. Right eye: No discharge. Left eye: No discharge. Neck:      Musculoskeletal: Neck supple. Cardiovascular:      Rate and Rhythm: Normal rate and regular rhythm. Pulmonary:      Effort: Pulmonary effort is normal. No respiratory distress. Breath sounds: Normal breath sounds. No stridor. No wheezing or rales. Abdominal:      General: There is no distension. Palpations: Abdomen is soft. Tenderness: There is no abdominal tenderness. Musculoskeletal: Normal range of motion. Lymphadenopathy:      Cervical: No cervical adenopathy. Skin:     General: Skin is warm and dry. Findings: Rash present. No erythema. Comments: Rash of eczema present on the bilateral antecubital fossa and his neck and his face. No drainage. Neurological:      Mental Status: He is alert and oriented to person, place, and time. Psychiatric:         Behavior: Behavior normal.             DIAGNOSTIC RESULTS     EKG: All EKG's are interpreted by the Emergency Department Physician who either signs or Co-signs this chart in the absence of a cardiologist.    Not indicated    RADIOLOGY:   Non-plain film images such as CT, Ultrasound and MRI are read by the radiologist. Plain radiographic images are visualized and preliminarily interpreted by the emergency physician with the below findings:    Not indicated    Interpretation per the Radiologist below, if available at the time of this note:        LABS:  Labs Reviewed - No data to display    All other labs were within normal range or not returned as of this dictation. EMERGENCY DEPARTMENT COURSE and DIFFERENTIAL DIAGNOSIS/MDM:   Vitals:    Vitals:    08/20/20 1334   BP: (!) 172/108   Pulse: 81   Resp: 16   Temp: 98.1 °F (36.7 °C)   TempSrc: Oral   SpO2: 98%       Orders Placed This Encounter   Medications    dexamethasone (DECADRON) injection 10 mg    predniSONE (DELTASONE) 10 MG tablet     Sig: Take 4 by mouth daily for 3 days then  3 by mouth daily for 3 days then  2 by mouth daily for 3 days then  1 by mouth daily for 3 days     Dispense:  30 tablet     Refill:  0       Medical Decision Making: He was given IM Decadron and prescribed prednisone. Treatment diagnosis and follow-up were discussed with the patient      CONSULTS:  None    PROCEDURES:  None    FINAL IMPRESSION      1. Eczema, unspecified type          DISPOSITION/PLAN   DISPOSITION Decision To Discharge 08/20/2020 01:53:38 PM      PATIENT REFERRED TO:   Joseluis Galan MD  [address]   [facility] [address]  [phone]      As needed    [facility] ED  [address] [facility]  [phone]    If symptoms worsen      DISCHARGE MEDICATIONS:     New Prescriptions    PREDNISONE (DELTASONE) 10 MG TABLET    Take 4 by mouth daily for 3 days then  3 by mouth daily for 3 days then  2 by mouth daily for 3 days then  1 by mouth daily for 3 days         (Please note that portions of this note were completed with a voice recognition program.  Efforts were made to edit the dictations but occasionally words are mis-transcribed.)    Sarah Luis MD  Attending Emergency Physician            Sarah Luis MD  08/20/20 5165

## 2020-08-20 NOTE — ED NOTES
Pt presents to ed c/o eczema exacerbation. He states it has flared over the last week. Noticeable eczema.        Olamide Burrows RN  08/20/20 0555

## 2020-08-24 ENCOUNTER — NURSE TRIAGE (OUTPATIENT)
Dept: OTHER | Facility: CLINIC | Age: 40
End: 2020-08-24

## 2020-08-24 NOTE — TELEPHONE ENCOUNTER
Reason for Disposition   [2] Systolic BP  >= 687 OR Diastolic >= 439  AND [2] having NO cardiac or neurologic symptoms    Answer Assessment - Initial Assessment Questions  1. BLOOD PRESSURE: \"What is the blood pressure? \" \"Did you take at least two measurements 5 minutes apart? \"        Pt attempted to take BP while on phone and was unable to read- the most recent BP that pt was able to get done at [facility] was 153/133           2. ONSET: \"When did you take your blood pressure? \"       \"its always been high\"     3. HOW: \"How did you obtain the blood pressure? \" (e.g., visiting nurse, automatic home BP monitor)        Home cuff    4. HISTORY: \"Do you have a history of high blood pressure? \"       Pt has been seen in ED for HTN and told to f/u with PCP     5. MEDICATIONS: Darhenriettacooper Ulysses you taking any medications for blood pressure? \" \"Have you missed any doses recently? \"         Pt is on daily medication and does take meds as prescribed     6. OTHER SYMPTOMS: \"Do you have any symptoms? \" (e.g., headache, chest pain, blurred vision, difficulty breathing, weakness)          Denies currently     7. PREGNANCY: \"Is there any chance you are pregnant? \" \"When was your last menstrual period? \"      NA    Protocols used: HIGH BLOOD PRESSURE-ADULT-AH    Patient called [facility]) to schedule appointment, with red flag complaint, transferred to RN access for triage. Caller reports symptoms as documented above. Caller informed of disposition. Unable to get through to [facility]. Informed pt that they would need to call back tomorrow to schedule an appt as they requested. .  Care advice as documented. Pt and wife verbalized understanding and are agreeable to plan. Please do not respond to the triage nurse through this encounter. Any subsequent communication should be directly with the patient.

## 2020-10-25 ENCOUNTER — HOSPITAL ENCOUNTER (EMERGENCY)
Age: 40
Discharge: HOME | End: 2020-10-25
Attending: EMERGENCY MEDICINE
Payer: COMMERCIAL

## 2020-10-25 VITALS
SYSTOLIC BLOOD PRESSURE: 173 MMHG | OXYGEN SATURATION: 95 % | DIASTOLIC BLOOD PRESSURE: 121 MMHG | TEMPERATURE: 98.1 F | HEIGHT: 72 IN | WEIGHT: 220 LBS | RESPIRATION RATE: 14 BRPM | HEART RATE: 94 BPM | BODY MASS INDEX: 29.8 KG/M2

## 2020-10-25 PROCEDURE — 6360000002 HC RX W HCPCS: Performed by: PHYSICIAN ASSISTANT

## 2020-10-25 PROCEDURE — 99282 EMERGENCY DEPT VISIT SF MDM: CPT

## 2020-10-25 PROCEDURE — 96372 THER/PROPH/DIAG INJ SC/IM: CPT

## 2020-10-25 RX ORDER — DEXAMETHASONE SODIUM PHOSPHATE 10 MG/ML
10 INJECTION, SOLUTION INTRAMUSCULAR; INTRAVENOUS ONCE
Status: COMPLETED | OUTPATIENT
Start: 2020-10-25 | End: 2020-10-25

## 2020-10-25 RX ORDER — PREDNISONE 10 MG/1
TABLET ORAL
Qty: 30 TABLET | Refills: 0 | Status: SHIPPED | OUTPATIENT
Start: 2020-10-25 | End: 2021-03-01

## 2020-10-25 RX ADMIN — DEXAMETHASONE SODIUM PHOSPHATE 10 MG: 10 INJECTION, SOLUTION INTRAMUSCULAR; INTRAVENOUS at 13:42

## 2020-10-25 NOTE — ED PROVIDER NOTES
[address] [place] [address] ED  [facility]      Pt Name: Lele Garcia  MRN: [id number]  [place] 1980  Date ofevaluation: 10/25/2020  Provider: Kassandra Queen PA-C    CHIEF COMPLAINT       Chief Complaint   Patient presents with    Rash         HISTORY OF PRESENT ILLNESS  (Location/Symptom, Timing/Onset, Context/Setting, Quality, Duration, Modifying Factors, Severity.)   Lele Garcia is a 36 y.o. male who presents to the emergency department with rash. Patient has a longstanding history of eczema. Patient has been on methotrexate in the past but has not seen a dermatologist in quite some time. Patient works for the [employer] and says when he is in the elements and temperature changes he typically has a flareup. Current flareup has been ongoing for the last week. He has adult eczema for a large part of his life. Typically steroid shots and prednisone helps getting things moved in the right direction. No deaf alleviating aggravating factors. No other complaints. Nursing Notes were reviewed. ALLERGIES     Patient has no known allergies. CURRENT MEDICATIONS       Previous Medications    AMLODIPINE (NORVASC) 5 MG TABLET    Take 1 tablet by mouth daily    CLOBETASOL (TEMOVATE) 0.05 % OINTMENT    Apply topically 2 times daily. IBUPROFEN (ADVIL;MOTRIN) 600 MG TABLET    Take 1 tablet by mouth 4 times daily as needed for Pain    LISINOPRIL (PRINIVIL) 10 MG TABLET    Take 2 tablets by mouth daily    MINERAL OIL-HYDROPHILIC PETROLATUM (AQUAPHOR) OINTMENT    Apply topically at night before bed    SKIN PROTECTANTS, MISC. (EUCERIN) CREAM    Apply topically twice a day. PAST MEDICAL HISTORY         Diagnosis Date    Back pain     on 12/24/19 pt denies pain mgmnt    Diverticulitis     Eczema     HTN (hypertension)     Psoriasis        SURGICAL HISTORY     History reviewed. No pertinent surgical history.       FAMILY HISTORY           Problem Relation Age of Onset    Cancer Mother         colon cancer-  54y/o    Diabetes Maternal Grandmother      Family Status   Relation Name Status    Mother  (Not Specified)    MGM  (Not Specified)        SOCIAL HISTORY      reports that he has been smoking cigars and cigarettes. He has a 10.00 pack-year smoking history. He has never used smokeless tobacco. He reports current alcohol use. He reports that he does not use drugs. REVIEW OFSYSTEMS    (2-9 systems for level 4, 10 or more for level 5)   Review of Systems    Except as noted above the remainder of the review of systems was reviewed and negative. PHYSICAL EXAM    (up to 7 for level 4, 8 or more for level 5)     ED Triage Vitals   BP Temp Temp src Pulse Resp SpO2 Height Weight   10/25/20 1323 10/25/20 1323 -- 10/25/20 1323 10/25/20 1323 10/25/20 1323 10/25/20 1321 10/25/20 1321   (!) 173/121 98.1 °F (36.7 °C)  94 14 95 % 6' (1.829 m) 220 lb (99.8 kg)      Physical Exam  Constitutional:       Appearance: He is well-developed. HENT:      Head: Normocephalic and atraumatic. Neck:      Musculoskeletal: Normal range of motion and neck supple. Cardiovascular:      Rate and Rhythm: Normal rate and regular rhythm. Pulmonary:      Effort: Pulmonary effort is normal.      Breath sounds: Normal breath sounds. Abdominal:      Palpations: Abdomen is soft. Musculoskeletal: Normal range of motion. Skin:     General: Skin is warm. Findings: Rash ( Eczematous rash along the antecubital fossa, upper extremities, neck and face.) present. Neurological:      Mental Status: He is alert and oriented to person, place, and time.    Psychiatric:         Behavior: Behavior normal.                 DIAGNOSTIC RESULTS     EKG: All EKG's are interpreted by the Emergency Department Physician who either signs or Co-signs this chart in the absence of a cardiologist.        RADIOLOGY:   Non-plain film images such as CT, Ultrasound and MRI are read by the radiologist. Plain radiographic images arevisualized and preliminarily interpreted by the emergency physician with the below findings:        Interpretation per the Radiologist below, if available at thetime of this note:          ED BEDSIDE ULTRASOUND:   Performed by ED Physician - none    LABS:  Labs Reviewed - No data to display    All other labs were within normal range or not returned as of this dictation. EMERGENCY DEPARTMENT COURSE and DIFFERENTIAL DIAGNOSIS/MDM:   Vitals:    Vitals:    10/25/20 1321 10/25/20 1323   BP:  (!) 173/121   Pulse:  94   Resp:  14   Temp:  98.1 °F (36.7 °C)   SpO2:  95%   Weight: 220 lb (99.8 kg)    Height: 6' (1.829 m)      Patient given Decadron and prednisone taper course and discharged home. He will follow back up with his dermatologist.    CONSULTS:  None    PROCEDURES:  Procedures        FINAL IMPRESSION      1. Eczema, unspecified type          DISPOSITION/PLAN   DISPOSITION Decision To Discharge 10/25/2020 01:36:39 PM      PATIENTREFERRED TO:   Josselin Diaz MD  [address]   [address]  [address]  [phone]    In 3 days        DISCHARGE MEDICATIONS:     New Prescriptions    PREDNISONE (DELTASONE) 10 MG TABLET    Take 5 tablets for 2 days  Take 4 tablets for 2 days  Take 3 tablets for 2 days  Take 2 tablets for 2 days  Take 1 tablets for 2 days           (Please note that portions of this note were completed with a voice recognition program.  Efforts were made to edit thedictations but occasionally words are mis-transcribed.)    CHRISTIN Everett PA-C  10/25/20 1335       Karine Valdovinos MD  10/28/20 9820

## 2020-10-28 NOTE — ED PROVIDER NOTES
eMERGENCY dEPARTMENT eNCOUnter   [address] Name: Albetr Alaniz  MRN: [id number]  [place] 1980  Date of evaluation: 10/28/20     Albert Alaniz is a 36 y.o. male with CC: Rash      Based on the medical record the care appears appropriate. I was personally available for consultation in the Emergency Department.     Dipti Painter MD  Attending Emergency Physician                  Renita Tran MD  10/28/20 7642

## 2020-11-23 ENCOUNTER — OFFICE VISIT (OUTPATIENT)
Dept: FAMILY MEDICINE CLINIC | Age: 40
End: 2020-11-23
Payer: COMMERCIAL

## 2020-11-23 VITALS
SYSTOLIC BLOOD PRESSURE: 174 MMHG | DIASTOLIC BLOOD PRESSURE: 118 MMHG | HEART RATE: 85 BPM | WEIGHT: 220 LBS | TEMPERATURE: 97.2 F | BODY MASS INDEX: 32.58 KG/M2 | HEIGHT: 69 IN

## 2020-11-23 PROCEDURE — 99213 OFFICE O/P EST LOW 20 MIN: CPT

## 2020-11-23 RX ORDER — AMLODIPINE BESYLATE 5 MG/1
5 TABLET ORAL DAILY
Qty: 90 TABLET | Refills: 2 | Status: SHIPPED | OUTPATIENT
Start: 2020-11-23 | End: 2021-09-06

## 2020-11-23 RX ORDER — LISINOPRIL 10 MG/1
20 TABLET ORAL DAILY
Qty: 60 TABLET | Refills: 1 | Status: SHIPPED | OUTPATIENT
Start: 2020-11-23 | End: 2021-04-19 | Stop reason: ALTCHOICE

## 2020-11-23 ASSESSMENT — ENCOUNTER SYMPTOMS
COLOR CHANGE: 0
NAUSEA: 0
PHOTOPHOBIA: 0
WHEEZING: 0
APNEA: 0
ABDOMINAL DISTENTION: 0
COUGH: 0
SHORTNESS OF BREATH: 0
DIARRHEA: 0
VOMITING: 0
CONSTIPATION: 0

## 2020-11-23 NOTE — PROGRESS NOTES
Visit Information    Have you changed or started any medications since your last visit including any over-the-counter medicines, vitamins, or herbal medicines? no   Have you stopped taking any of your medications? Is so, why? -  no  Are you having any side effects from any of your medications? - no    Have you seen any other physician or provider since your last visit?  no   Have you had any other diagnostic tests since your last visit?  no   Have you been seen in the emergency room and/or had an admission in a hospital since we last saw you?  no   Have you had your routine dental cleaning in the past 6 months?  no     Do you have an active MyChart account? If no, what is the barrier?   Yes    Patient Care Team:  Harish Cano MD as PCP - General (Family Medicine)    Medical History Review  Past Medical, Family, and Social History reviewed and does not contribute to the patient presenting condition    Health Maintenance   Topic Date Due    Varicella vaccine (1 of 2 - 2-dose childhood series) 10/08/1981    Pneumococcal 0-64 years Vaccine (1 of 1 - PPSV23) 10/08/1986    HIV screen  10/08/1995    Flu vaccine (1) 09/01/2020    Lipid screen  10/08/2020    DTaP/Tdap/Td vaccine (1 - Tdap) 02/03/2021 (Originally 10/8/1999)    Potassium monitoring  06/11/2021    Creatinine monitoring  06/11/2021    Hepatitis A vaccine  Aged Out    Hepatitis B vaccine  Aged Out    Hib vaccine  Aged Out    Meningococcal (ACWY) vaccine  Aged Out

## 2020-11-23 NOTE — PROGRESS NOTES
Attending Physician Statement  I have discussed the care of Neel Nieves 36 y.o. male, including pertinent history and exam findings, with the resident Dr. Beverly Moreland MD.    History and Exam:   Chief Complaint   Patient presents with    Hypertension    Other     knot in the middle of chest     Past Medical History:   Diagnosis Date    Back pain     on 12/24/19 pt denies pain mgmnt    Diverticulitis     Eczema     HTN (hypertension)     Psoriasis      No Known Allergies   I have seen and examined the patient and the key elements of the encounter have been performed by me. BP Readings from Last 3 Encounters:   11/23/20 (!) 174/118   10/25/20 (!) 173/121   08/20/20 (!) 172/108     BP (!) 174/118   Pulse 85   Temp 97.2 °F (36.2 °C) (Temporal)   Ht 5' 9\" (1.753 m)   Wt 220 lb (99.8 kg)   BMI 32.49 kg/m²   Lab Results   Component Value Date    WBC 12.3 (H) 06/11/2020    HGB 14.2 06/11/2020    HCT 43.0 06/11/2020     06/11/2020    ALT 21 06/11/2020    AST 23 06/11/2020     06/11/2020    K 3.7 06/11/2020     06/11/2020    CREATININE 0.74 06/11/2020    BUN 10 06/11/2020    CO2 23 06/11/2020     Lab Results   Component Value Date    LABALBU 4.2 06/11/2020     No results found for: IRON, TIBC, FERRITIN  No results found for: LDLCALC, LDLCHOLESTEROL, LDLDIRECT  I agree with the assessment, plan and the diagnosis of    Diagnosis Orders   1. Facial eczema  Dyan Lesches, MD, Dermatology, [employer]   2. Family history of colon cancer  Colonoscopy Routine   3. Breast cyst, left  Luteinizing Hormone    Follicle Stimulating Hormone    Testosterone    Prolactin    US BREAST COMPLETE LEFT   4. Essential hypertension  amLODIPine (NORVASC) 5 MG tablet    lisinopril (PRINIVIL) 10 MG tablet    . I agree with orders as documented by the resident. Recommendations:  HTN:  lisinorpil and Norvasc which has not been taking for 2 months  Refills provided    Eczema on face- Aquaphor, Eucerin.  Talked to derm in past. Prednisone given which helped slightly  FMHx colon cancer- diagnosed at 36. Needs colonoscopy. No BRBPR  Left Nipple sore, slightly enlarged, no discharge  Palpation- left slightly tender. No lumps  US, labs ordered for gynecomastia      More than 25 minutes spent  in face to face encounter with the patient and more than half in counseling. Patient's questions were answered. Patient Voiced understanding to the counseling. Return in about 1 month (around 12/23/2020) for HTN.    (GC Modifier)-Dr. Simeon Hayes MD

## 2020-11-23 NOTE — PROGRESS NOTES
Subjective:    Yue Cardenas is a 36 y.o. male with  has a past medical history of Back pain, Diverticulitis, Eczema, HTN (hypertension), and Psoriasis. Presented to the office today for:  Chief Complaint   Patient presents with    Hypertension    Other     knot in the middle of chest       HPI    44-year-old male came to the clinic because of facial eczema patient used to see a dermatologist and had triedTopical hydrocortisone, aquaphor, eucerin. Without much relief. I will give him a referral for dermatology. Patient also has a Hx of mother colon cancer at age 36. Sister with benign polyps. I will give her referral for colonoscopy. Patient is not complaining of any diarrhea, constipation, blood per rectum at the moment    Patient also has HTN, uncontrolled and he didn't take amlodipine for 2 months. I will refill his amlodipine along with lisinopril and have him back in a month to look at the blood pressure. Today blood pressure is 174/118 but patient denies any headaches, blurry vision, chest pain. Patient also complaining about left sided breast tenderness on physical examination left-sided breast seems slightly enlarged and compared to the right 1 no skin changes, lumps, nodes appreciated. Will order appropriate lab work including breast ultrasound. Review of Systems   Constitutional: Negative for activity change, appetite change, fatigue, fever and unexpected weight change. HENT: Negative for congestion. Worsening facial eczema   Eyes: Negative for photophobia and visual disturbance. Respiratory: Negative for apnea, cough, shortness of breath and wheezing. Cardiovascular: Negative for chest pain, palpitations and leg swelling. Gastrointestinal: Negative for abdominal distention, constipation, diarrhea, nausea and vomiting. Endocrine: Negative for polyuria. Left-sided breast enlargement and tenderness   Genitourinary: Negative for dysuria and urgency.    Skin: Negative for color change, pallor, rash and wound. Neurological: Negative for dizziness, tremors, weakness, light-headedness and headaches. Psychiatric/Behavioral: Negative for agitation, behavioral problems, confusion and decreased concentration. The patient has a   Family History   Problem Relation Age of Onset    Cancer Mother         colon cancer-  56y/o    Diabetes Maternal Grandmother        Objective:    BP (!) 174/118   Pulse 85   Temp 97.2 °F (36.2 °C) (Temporal)   Ht 5' 9\" (1.753 m)   Wt 220 lb (99.8 kg)   BMI 32.49 kg/m²    BP Readings from Last 3 Encounters:   20 (!) 174/118   10/25/20 (!) 173/121   20 (!) 172/108       Physical Exam  Constitutional:       General: He is not in acute distress. Appearance: Normal appearance. He is not ill-appearing, toxic-appearing or diaphoretic. HENT:      Head: Normocephalic and atraumatic. Nose: Nose normal. No congestion. Mouth/Throat:      Mouth: Mucous membranes are dry. Comments: Facial eczema around the lips  Eyes:      General: No scleral icterus. Right eye: No discharge. Left eye: No discharge. Extraocular Movements: Extraocular movements intact. Conjunctiva/sclera: Conjunctivae normal.      Pupils: Pupils are equal, round, and reactive to light. Cardiovascular:      Rate and Rhythm: Normal rate and regular rhythm. Pulses: Normal pulses. Heart sounds: No murmur. Pulmonary:      Effort: Pulmonary effort is normal.      Breath sounds: Normal breath sounds. No wheezing. Abdominal:      General: There is no distension. Tenderness: There is no abdominal tenderness. Musculoskeletal: Normal range of motion. General: No swelling or tenderness. Right lower leg: No edema. Left lower leg: No edema. Skin:     General: Skin is warm. Coloration: Skin is not jaundiced. Findings: No bruising, erythema, lesion or rash.       Comments: Left-sided breast tenderness and enlargement compared to the right breast.  No rash, lumps or discharge noted. Neurological:      Mental Status: He is alert and oriented to person, place, and time. Motor: No weakness. Psychiatric:         Mood and Affect: Mood normal.         Behavior: Behavior normal.         Thought Content: Thought content normal.         Judgment: Judgment normal.         Lab Results   Component Value Date    WBC 12.3 (H) 06/11/2020    HGB 14.2 06/11/2020    HCT 43.0 06/11/2020     06/11/2020    ALT 21 06/11/2020    AST 23 06/11/2020     06/11/2020    K 3.7 06/11/2020     06/11/2020    CREATININE 0.74 06/11/2020    BUN 10 06/11/2020    CO2 23 06/11/2020     Lab Results   Component Value Date    CALCIUM 9.5 06/11/2020    PHOS 3.9 10/07/2011     No results found for: LDLCALC, LDLCHOLESTEROL, LDLDIRECT    Assessment and Plan:    1. Facial eczema  - Valdemar Marie MD, Dermatology, [place]    2. Family history of colon cancer  - Colonoscopy Routine; Future    3. Breast cyst, left  - Luteinizing Hormone; Future  - Follicle Stimulating Hormone; Future  - Testosterone; Future  - Prolactin; Future  - US BREAST COMPLETE LEFT; Future    4. Essential hypertension  - amLODIPine (NORVASC) 5 MG tablet; Take 1 tablet by mouth daily  Dispense: 90 tablet; Refill: 2  - lisinopril (PRINIVIL) 10 MG tablet; Take 2 tablets by mouth daily  Dispense: 60 tablet;  Refill: 1          Requested Prescriptions     Signed Prescriptions Disp Refills    amLODIPine (NORVASC) 5 MG tablet 90 tablet 2     Sig: Take 1 tablet by mouth daily    lisinopril (PRINIVIL) 10 MG tablet 60 tablet 1     Sig: Take 2 tablets by mouth daily       Medications Discontinued During This Encounter   Medication Reason    amLODIPine (NORVASC) 5 MG tablet REORDER    lisinopril (PRINIVIL) 10 MG tablet EDELORDER       Dinesh received counseling on the following healthy behaviors: nutrition, exercise and medication adherence    Discussed use,benefit, and side effects of prescribed medications. Barriers to medication compliance addressed. All patient questions answered. Pt voiced understanding. Return in about 1 month (around 12/23/2020) for HTN. Disclaimer: Some orall of this note was transcribed using voice-recognition software. This may cause typographical errors occasionally. Although all effort is made to fix these errors, please do not hesitate to contact our office if there Jessica Szymanski concern with the understanding of this note.

## 2021-03-01 ENCOUNTER — HOSPITAL ENCOUNTER (EMERGENCY)
Age: 41
Discharge: HOME | End: 2021-03-01
Attending: EMERGENCY MEDICINE
Payer: COMMERCIAL

## 2021-03-01 VITALS
HEIGHT: 69 IN | HEART RATE: 88 BPM | SYSTOLIC BLOOD PRESSURE: 167 MMHG | TEMPERATURE: 97.1 F | DIASTOLIC BLOOD PRESSURE: 98 MMHG | WEIGHT: 214 LBS | OXYGEN SATURATION: 97 % | RESPIRATION RATE: 14 BRPM | BODY MASS INDEX: 31.7 KG/M2

## 2021-03-01 DIAGNOSIS — M54.50 ACUTE EXACERBATION OF CHRONIC LOW BACK PAIN: Primary | ICD-10-CM

## 2021-03-01 DIAGNOSIS — G89.29 ACUTE EXACERBATION OF CHRONIC LOW BACK PAIN: Primary | ICD-10-CM

## 2021-03-01 PROCEDURE — 99283 EMERGENCY DEPT VISIT LOW MDM: CPT

## 2021-03-01 PROCEDURE — 6360000002 HC RX W HCPCS: Performed by: PHYSICIAN ASSISTANT

## 2021-03-01 PROCEDURE — 6370000000 HC RX 637 (ALT 250 FOR IP): Performed by: PHYSICIAN ASSISTANT

## 2021-03-01 PROCEDURE — 96372 THER/PROPH/DIAG INJ SC/IM: CPT

## 2021-03-01 RX ORDER — METAXALONE 800 MG/1
800 TABLET ORAL ONCE
Status: COMPLETED | OUTPATIENT
Start: 2021-03-01 | End: 2021-03-01

## 2021-03-01 RX ORDER — METHOCARBAMOL 750 MG/1
750 TABLET, FILM COATED ORAL 4 TIMES DAILY
Qty: 40 TABLET | Refills: 0 | Status: SHIPPED | OUTPATIENT
Start: 2021-03-01 | End: 2021-03-11

## 2021-03-01 RX ORDER — PREDNISONE 10 MG/1
TABLET ORAL
Qty: 30 TABLET | Refills: 0 | Status: SHIPPED | OUTPATIENT
Start: 2021-03-01 | End: 2021-04-19

## 2021-03-01 RX ORDER — KETOROLAC TROMETHAMINE 30 MG/ML
60 INJECTION, SOLUTION INTRAMUSCULAR; INTRAVENOUS ONCE
Status: COMPLETED | OUTPATIENT
Start: 2021-03-01 | End: 2021-03-01

## 2021-03-01 RX ADMIN — METAXALONE 800 MG: 800 TABLET ORAL at 11:25

## 2021-03-01 RX ADMIN — KETOROLAC TROMETHAMINE 60 MG: 30 INJECTION, SOLUTION INTRAMUSCULAR at 11:25

## 2021-03-01 ASSESSMENT — PAIN SCALES - GENERAL
PAINLEVEL_OUTOF10: 8
PAINLEVEL_OUTOF10: 8

## 2021-03-01 NOTE — ED PROVIDER NOTES
[address] [place] [address] ED  [facility]      Pt Name: Jennifer Lea  MRN: [id number]  [place] 1980  Date ofevaluation: 3/1/2021  Provider: Tristan Lemon Dr       Chief Complaint   Patient presents with    Back Pain         HISTORY OF PRESENT ILLNESS  (Location/Symptom, Timing/Onset, Context/Setting, Quality, Duration, Modifying Factors, Severity.)   Jennifer Lea is a 36 y.o. male who presents to the emergency department with lower and mid back pain. Patient reports a longstanding history of chronic back pain. States he has been working longer hours here lately. Works at [employer] [place]. Reports a lot of bending and lifting at his job. Pain has been present for the last 2 days. Denies any falls or direct contact or trauma. No other complaints. Nursing Notes were reviewed. ALLERGIES     Patient has no known allergies. CURRENT MEDICATIONS       Discharge Medication List as of 3/1/2021 11:35 AM      CONTINUE these medications which have NOT CHANGED    Details   amLODIPine (NORVASC) 5 MG tablet Take 1 tablet by mouth daily, Disp-90 tablet,R-2Normal      lisinopril (PRINIVIL) 10 MG tablet Take 2 tablets by mouth daily, Disp-60 tablet,R-1Normal      clobetasol (TEMOVATE) 0.05 % ointment Apply topically 2 times daily. , Disp-60 g, R-3, Normal      Skin Protectants, Misc. (EUCERIN) cream Apply topically twice a day., Disp-1 Package, R-0, Print      mineral oil-hydrophilic petrolatum (AQUAPHOR) ointment Apply topically at night before bed, Disp-396 g, R-0, Print             PAST MEDICAL HISTORY         Diagnosis Date    Back pain     on 19 pt denies pain mgmnt    Diverticulitis     Eczema     HTN (hypertension)     Psoriasis        SURGICAL HISTORY     History reviewed. No pertinent surgical history.       FAMILY HISTORY           Problem Relation Age of Onset    Cancer Mother         colon cancer-  56y/o    Diabetes Maternal Grandmother      Family Status   Relation Name Status    Mother  (Not Specified)    MGM  (Not Specified)        SOCIAL HISTORY      reports that he has been smoking cigars and cigarettes. He has a 10.00 pack-year smoking history. He has never used smokeless tobacco. He reports current alcohol use. He reports that he does not use drugs. REVIEW OFSYSTEMS    (2-9 systems for level 4, 10 or more for level 5)   Review of Systems    Except as noted above the remainder of the review of systems was reviewed and negative. PHYSICAL EXAM    (up to 7 for level 4, 8 or more for level 5)     ED Triage Vitals [03/01/21 1048]   BP Temp Temp Source Pulse Resp SpO2 Height Weight   (!) 167/98 97.1 °F (36.2 °C) Oral 88 14 97 % 5' 9\" (1.753 m) 214 lb (97.1 kg)      Physical Exam  Constitutional:       Appearance: He is well-developed. HENT:      Head: Normocephalic and atraumatic. Neck:      Musculoskeletal: Normal range of motion and neck supple. Cardiovascular:      Rate and Rhythm: Normal rate and regular rhythm. Pulmonary:      Effort: Pulmonary effort is normal.      Breath sounds: Normal breath sounds. Abdominal:      Palpations: Abdomen is soft. Musculoskeletal: Normal range of motion. Thoracic back: He exhibits no bony tenderness and no swelling. Back:    Skin:     General: Skin is warm. Neurological:      Mental Status: He is alert and oriented to person, place, and time.    Psychiatric:         Behavior: Behavior normal.                 DIAGNOSTIC RESULTS     EKG: All EKG's are interpreted by the Emergency Department Physician who either signs or Co-signs this chart in the absence of a cardiologist.        RADIOLOGY:   Non-plain film images such as CT, Ultrasound and MRI are read by the radiologist. Plain radiographic images arevisualized and preliminarily interpreted by the emergency physician with the below findings:        Interpretation per the Radiologist below, if available at thetime of this note:          ED BEDSIDE ULTRASOUND:   Performed by ED Physician - none    LABS:  Labs Reviewed - No data to display    All other labs were within normal range or not returned as of this dictation. EMERGENCY DEPARTMENT COURSE and DIFFERENTIAL DIAGNOSIS/MDM:   Vitals:    Vitals:    03/01/21 1048   BP: (!) 167/98   Pulse: 88   Resp: 14   Temp: 97.1 °F (36.2 °C)   TempSrc: Oral   SpO2: 97%   Weight: 214 lb (97.1 kg)   Height: 5' 9\" (1.753 m)     Imaging was discussed. Patient declined x-rays. Patient will be treated symptomatically with Toradol in the ER. Discharged home with anti-inflammatories and muscle axis. Patient is in agreement plan of care. Will discharge home. CONSULTS:  None    PROCEDURES:  Procedures        FINAL IMPRESSION      1. Acute exacerbation of chronic low back pain          DISPOSITION/PLAN   DISPOSITION Decision To Discharge 03/01/2021 11:34:04 AM      PATIENTREFERRED TO:   Leticia Taylor MD  [address]   [address]  [id number]  [phone]    In 3 days        DISCHARGE MEDICATIONS:     Discharge Medication List as of 3/1/2021 11:35 AM      START taking these medications    Details   methocarbamol (ROBAXIN-750) 750 MG tablet Take 1 tablet by mouth 4 times daily for 10 days, Disp-40 tablet, R-0Print                 (Please note that portions of this note were completed with a voice recognition program.  Efforts were made to edit thedictations but occasionally words are mis-transcribed.)    CHRISTIN Dolan PA-C  03/01/21 [id number]

## 2021-03-03 NOTE — ED PROVIDER NOTES
eMERGENCY dEPARTMENT eNCOUnter   [address] [place] Name: Sahil Carey  MRN: [id number]  [place] 1980  Date of evaluation: 3/3/21     Sahil Carey is a 36 y.o. male with CC: Back Pain      Based on the medical record the care appears appropriate. I was personally available for consultation in the Emergency Department.     Andrew Beasley MD  Attending Emergency Physician                    Andrew Beasley MD  03/03/21 1807

## 2021-03-18 ENCOUNTER — TELEPHONE (OUTPATIENT)
Dept: INTERNAL MEDICINE CLINIC | Age: 41
End: 2021-03-18

## 2021-04-02 ENCOUNTER — TELEPHONE (OUTPATIENT)
Dept: FAMILY MEDICINE CLINIC | Age: 41
End: 2021-04-02

## 2021-04-19 ENCOUNTER — OFFICE VISIT (OUTPATIENT)
Dept: INTERNAL MEDICINE CLINIC | Age: 41
End: 2021-04-19
Payer: COMMERCIAL

## 2021-04-19 VITALS
HEART RATE: 78 BPM | TEMPERATURE: 97.6 F | SYSTOLIC BLOOD PRESSURE: 140 MMHG | WEIGHT: 217.4 LBS | RESPIRATION RATE: 20 BRPM | DIASTOLIC BLOOD PRESSURE: 100 MMHG | BODY MASS INDEX: 32.2 KG/M2 | OXYGEN SATURATION: 98 % | HEIGHT: 69 IN

## 2021-04-19 DIAGNOSIS — I10 BENIGN ESSENTIAL HTN: ICD-10-CM

## 2021-04-19 DIAGNOSIS — M50.30 DEGENERATION, INTERVERTEBRAL DISC, CERVICAL: ICD-10-CM

## 2021-04-19 DIAGNOSIS — F17.200 READY TO QUIT SMOKING: ICD-10-CM

## 2021-04-19 DIAGNOSIS — Z76.89 ENCOUNTER TO ESTABLISH CARE WITH NEW DOCTOR: Primary | ICD-10-CM

## 2021-04-19 DIAGNOSIS — L30.9 ECZEMA, UNSPECIFIED TYPE: ICD-10-CM

## 2021-04-19 PROCEDURE — 99204 OFFICE O/P NEW MOD 45 MIN: CPT | Performed by: FAMILY MEDICINE

## 2021-04-19 RX ORDER — TRIAMTERENE AND HYDROCHLOROTHIAZIDE 75; 50 MG/1; MG/1
1 TABLET ORAL DAILY
Qty: 90 TABLET | Refills: 0 | Status: SHIPPED | OUTPATIENT
Start: 2021-04-19 | End: 2022-01-01

## 2021-04-19 SDOH — ECONOMIC STABILITY: TRANSPORTATION INSECURITY
IN THE PAST 12 MONTHS, HAS THE LACK OF TRANSPORTATION KEPT YOU FROM MEDICAL APPOINTMENTS OR FROM GETTING MEDICATIONS?: NO

## 2021-04-19 SDOH — ECONOMIC STABILITY: INCOME INSECURITY: HOW HARD IS IT FOR YOU TO PAY FOR THE VERY BASICS LIKE FOOD, HOUSING, MEDICAL CARE, AND HEATING?: NOT HARD AT ALL

## 2021-04-19 SDOH — ECONOMIC STABILITY: TRANSPORTATION INSECURITY
IN THE PAST 12 MONTHS, HAS LACK OF TRANSPORTATION KEPT YOU FROM MEETINGS, WORK, OR FROM GETTING THINGS NEEDED FOR DAILY LIVING?: NO

## 2021-04-19 ASSESSMENT — ENCOUNTER SYMPTOMS
RESPIRATORY NEGATIVE: 1
EYES NEGATIVE: 1
ALLERGIC/IMMUNOLOGIC NEGATIVE: 1
GASTROINTESTINAL NEGATIVE: 1

## 2021-04-19 ASSESSMENT — PATIENT HEALTH QUESTIONNAIRE - PHQ9
2. FEELING DOWN, DEPRESSED OR HOPELESS: 0
SUM OF ALL RESPONSES TO PHQ QUESTIONS 1-9: 0

## 2021-04-19 NOTE — PROGRESS NOTES
Subjective:      Patient ID: Jey Quezada is a 36 y.o. male. Hypertension  This is a chronic problem. The current episode started more than 1 month ago. The problem has been waxing and waning since onset. The problem is uncontrolled. Associated symptoms include anxiety. Risk factors for coronary artery disease include male gender and stress. Past treatments include ACE inhibitors and calcium channel blockers. The current treatment provides moderate improvement. There are no compliance problems. Review of Systems   Constitutional: Negative. HENT: Negative. Eyes: Negative. Respiratory: Negative. Cardiovascular: Negative. Gastrointestinal: Negative. Endocrine: Negative. Musculoskeletal: Negative. Skin: Positive for rash. Allergic/Immunologic: Negative. Neurological: Negative. Hematological: Negative. Psychiatric/Behavioral: The patient is nervous/anxious. Past family and social history unremarkable. Diagnosis Orders   1. Encounter to establish care with new doctor  Urine Drug Screen    HIV Rapid 1&2    Hepatitis Panel, Acute    CBC    Comprehensive Metabolic Panel    Hemoglobin A1C    Lipid Panel    Urinalysis with Microscopic    TSH without Reflex   2. Benign essential HTN  Urine Drug Screen    HIV Rapid 1&2    Hepatitis Panel, Acute    CBC    Comprehensive Metabolic Panel    Hemoglobin A1C    Lipid Panel    Urinalysis with Microscopic    TSH without Reflex   3. Ready to quit smoking     4. Degeneration, intervertebral disc, cervical     5. Eczema, unspecified type  Paul Miller MD, Dermatology, [place]         Objective:   Physical Exam  Vitals signs and nursing note reviewed. Constitutional:       Appearance: He is well-developed. HENT:      Head: Normocephalic and atraumatic.       Right Ear: External ear normal.      Left Ear: External ear normal.      Nose: Nose normal.   Eyes:      Conjunctiva/sclera: Conjunctivae normal.      Pupils: Pupils are equal, round, and reactive to light. Neck:      Musculoskeletal: Normal range of motion and neck supple. Cardiovascular:      Rate and Rhythm: Normal rate and regular rhythm. Heart sounds: Normal heart sounds. Comments: Uncontrolled hypertension  Pulmonary:      Effort: Pulmonary effort is normal.      Breath sounds: Normal breath sounds. Comments: Recently quit tobacco  Abdominal:      General: Bowel sounds are normal.      Palpations: Abdomen is soft. Musculoskeletal: Normal range of motion. Comments: Degenerative discogenic disease of the cervical spine without any sign of myelopathy. No reproducibility with palpation, anatomical alignment, full active range of motion. No paraspinal muscle spasm. Neurologically intact   Skin:     General: Skin is warm and dry. Comments: Atopic dermatitis. Patient has been on multiple medications including methotrexate as provided by different dermatologist    Neurological:      Mental Status: He is alert and oriented to person, place, and time. Deep Tendon Reflexes: Reflexes are normal and symmetric. Psychiatric:         Behavior: Behavior normal.         Thought Content: Thought content normal.         Assessment:       Diagnosis Orders   1. Encounter to establish care with new doctor  Urine Drug Screen    HIV Rapid 1&2    Hepatitis Panel, Acute    CBC    Comprehensive Metabolic Panel    Hemoglobin A1C    Lipid Panel    Urinalysis with Microscopic    TSH without Reflex   2. Benign essential HTN  Urine Drug Screen    HIV Rapid 1&2    Hepatitis Panel, Acute    CBC    Comprehensive Metabolic Panel    Hemoglobin A1C    Lipid Panel    Urinalysis with Microscopic    TSH without Reflex   3. Ready to quit smoking     4. Degeneration, intervertebral disc, cervical     5. Eczema, unspecified type  Isabelle Padron MD, Dermatology, [place]           Plan:       This is a 41-year-old -American male who is a [employer] employee is presented came along with his wife to establish care. He is afebrile clinical examination appears benign  Hypertension. Patient states that in the past he has been treated with amlodipine and lisinopril. However currently he is taking only lisinopril chronically. I advised him to increase amlodipine from 5 mg to 10 mg p.o. daily. I also added Maxide. Advised to discontinue lisinopril. 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 Patient states that this has been a chronic issue. He is advised to take over-the-counter Tylenol with as needed use of anti-inflammatory. Consider pain management consultation. Recently quit tobacco.  He denies pulmonary distress  Eczema. Patient states that he has seen multiple dermatologists in the past.  Patient states that methotrexate in the past has helped him. He agrees to be seen by [facility] dermatology. He denies tobacco, excessive alcohol or illicit drug use  Is advised to comply with medication  Further recommendations to follow labs  Advised to bring blood pressure log at next visit ordered to further adjust his medications  This note is created with a voice recognition program and while intend to generate a document that accurately reflects the content of the visit, no guarantee can be provided that every mistake has been identified and corrected by editing.           Jerrel Romberg, MD

## 2021-05-03 ENCOUNTER — TELEPHONE (OUTPATIENT)
Dept: INTERNAL MEDICINE CLINIC | Age: 41
End: 2021-05-03

## 2021-06-05 ENCOUNTER — HOSPITAL ENCOUNTER (EMERGENCY)
Age: 41
Discharge: HOME | End: 2021-06-05
Attending: EMERGENCY MEDICINE
Payer: COMMERCIAL

## 2021-06-05 VITALS
HEART RATE: 101 BPM | DIASTOLIC BLOOD PRESSURE: 90 MMHG | SYSTOLIC BLOOD PRESSURE: 149 MMHG | TEMPERATURE: 97.9 F | OXYGEN SATURATION: 96 % | HEIGHT: 69 IN | WEIGHT: 217 LBS | BODY MASS INDEX: 32.14 KG/M2 | RESPIRATION RATE: 14 BRPM

## 2021-06-05 DIAGNOSIS — S39.012A STRAIN OF LUMBAR REGION, INITIAL ENCOUNTER: Primary | ICD-10-CM

## 2021-06-05 PROCEDURE — 6360000002 HC RX W HCPCS: Performed by: EMERGENCY MEDICINE

## 2021-06-05 PROCEDURE — 99283 EMERGENCY DEPT VISIT LOW MDM: CPT

## 2021-06-05 PROCEDURE — 96372 THER/PROPH/DIAG INJ SC/IM: CPT

## 2021-06-05 RX ORDER — METHYLPREDNISOLONE 4 MG/1
TABLET ORAL
Qty: 1 KIT | Refills: 0 | Status: SHIPPED | OUTPATIENT
Start: 2021-06-05 | End: 2021-06-11

## 2021-06-05 RX ORDER — ORPHENADRINE CITRATE 30 MG/ML
60 INJECTION INTRAMUSCULAR; INTRAVENOUS ONCE
Status: COMPLETED | OUTPATIENT
Start: 2021-06-05 | End: 2021-06-05

## 2021-06-05 RX ORDER — KETOROLAC TROMETHAMINE 30 MG/ML
30 INJECTION, SOLUTION INTRAMUSCULAR; INTRAVENOUS ONCE
Status: COMPLETED | OUTPATIENT
Start: 2021-06-05 | End: 2021-06-05

## 2021-06-05 RX ORDER — CYCLOBENZAPRINE HCL 10 MG
10 TABLET ORAL 2 TIMES DAILY PRN
Qty: 20 TABLET | Refills: 0 | Status: SHIPPED | OUTPATIENT
Start: 2021-06-05 | End: 2021-06-15

## 2021-06-05 RX ADMIN — ORPHENADRINE CITRATE 60 MG: 60 INJECTION INTRAMUSCULAR; INTRAVENOUS at 12:08

## 2021-06-05 RX ADMIN — KETOROLAC TROMETHAMINE 30 MG: 30 INJECTION, SOLUTION INTRAMUSCULAR; INTRAVENOUS at 12:08

## 2021-06-05 ASSESSMENT — PAIN DESCRIPTION - LOCATION: LOCATION: BACK

## 2021-06-05 ASSESSMENT — ENCOUNTER SYMPTOMS
SINUS PAIN: 0
ABDOMINAL DISTENTION: 0
CONSTIPATION: 0
SHORTNESS OF BREATH: 0
DIARRHEA: 0
EYES NEGATIVE: 1
CHEST TIGHTNESS: 0
VOMITING: 0
APNEA: 0
COLOR CHANGE: 0

## 2021-06-05 ASSESSMENT — PAIN SCALES - GENERAL
PAINLEVEL_OUTOF10: 7
PAINLEVEL_OUTOF10: 7

## 2021-06-05 ASSESSMENT — PAIN DESCRIPTION - PAIN TYPE: TYPE: ACUTE PAIN

## 2021-06-05 NOTE — ED PROVIDER NOTES
EMERGENCY DEPARTMENT ENCOUNTER    Pt Name: Eulalio Lyons  MRN: [id number]  [place] 1980  Date of evaluation: 6/5/21  CHIEF COMPLAINT       Chief Complaint   Patient presents with    Back Pain     HISTORY OF PRESENT ILLNESS   79-year-old male presenting to the emergency room for mid to low back pain. Patient reports history of degenerative disc disease and issues with sciatica and back pain. Patient reports at work yesterday he was lifting boxes. Patient denies any trauma or recent spinal procedures. He states that today he woke up very sore and felt like he was in too much pain to go to work. He had tried taking Motrin at home with minimal relief. No numbness or tingling down the legs. Patient is ambulatory and has no sensory changes in the legs. REVIEW OF SYSTEMS     Review of Systems   Constitutional: Negative for activity change, chills and diaphoresis. HENT: Negative for congestion, sinus pain and tinnitus. Eyes: Negative. Respiratory: Negative for apnea, chest tightness and shortness of breath. Gastrointestinal: Negative for abdominal distention, constipation, diarrhea and vomiting. Genitourinary: Negative for difficulty urinating and frequency. Musculoskeletal: Negative for arthralgias and myalgias. Skin: Negative for color change and rash. Neurological: Negative for dizziness. Hematological: Negative. Psychiatric/Behavioral: Negative. PASTMEDICAL HISTORY     Past Medical History:   Diagnosis Date    Back pain     on 12/24/19 pt denies pain mgmnt    Diverticulitis     Eczema     HTN (hypertension)     Psoriasis      Past Problem List  Patient Active Problem List   Diagnosis Code    Eczema L30.9    Degeneration, intervertebral disc, cervical M50.30    Ready to quit smoking F17.200    Benign essential HTN I10     SURGICAL HISTORY     History reviewed. No pertinent surgical history.   CURRENT MEDICATIONS       Previous Medications    AMLODIPINE given muscle relaxants and anti-inflammatories. CRITICAL CARE:       PROCEDURES:    Procedures    DIAGNOSTIC RESULTS   EKG:All EKG's are interpreted by the Emergency Department Physician who either signs or Co-signs this chart in the absence of a cardiologist.        RADIOLOGY:All plain film, CT, MRI, and formal ultrasound images (except ED bedside ultrasound) are read by the radiologist, see reports below, unless otherwisenoted in MDM or here. No orders to display     LABS: All lab results were reviewed by myself, and all abnormals are listed below. Labs Reviewed - No data to display    EMERGENCY DEPARTMENTCOURSE:         Vitals:    Vitals:    06/05/21 1141   BP: (!) 149/90   Pulse: 101   Resp: 14   Temp: 97.9 °F (36.6 °C)   TempSrc: Oral   SpO2: 96%   Weight: 217 lb (98.4 kg)   Height: 5' 9\" (1.753 m)       The patient was given the following medications while in the emergency department:  Orders Placed This Encounter   Medications    orphenadrine (NORFLEX) injection 60 mg    ketorolac (TORADOL) injection 30 mg    methylPREDNISolone (MEDROL, TRISTEN,) 4 MG tablet     Sig: Take by mouth. Dispense:  1 kit     Refill:  0    cyclobenzaprine (FLEXERIL) 10 MG tablet     Sig: Take 1 tablet by mouth 2 times daily as needed for Muscle spasms     Dispense:  20 tablet     Refill:  0     CONSULTS:  None    FINAL IMPRESSION      1. Strain of lumbar region, initial encounter          DISPOSITION/PLAN   DISPOSITION Decision To Discharge 06/05/2021 11:59:56 AM      PATIENT REFERRED TO:  No follow-up provider specified. DISCHARGE MEDICATIONS:  New Prescriptions    CYCLOBENZAPRINE (FLEXERIL) 10 MG TABLET    Take 1 tablet by mouth 2 times daily as needed for Muscle spasms    METHYLPREDNISOLONE (MEDROL, TRISTEN,) 4 MG TABLET    Take by mouth.      Dennis Escamilla MD  Attending Emergency Physician                 Rosan Bernheim, MD  06/05/21 5638

## 2021-09-06 ENCOUNTER — APPOINTMENT (OUTPATIENT)
Dept: CT IMAGING | Age: 41
End: 2021-09-06
Payer: COMMERCIAL

## 2021-09-06 ENCOUNTER — HOSPITAL ENCOUNTER (EMERGENCY)
Age: 41
Discharge: HOME | End: 2021-09-06
Attending: EMERGENCY MEDICINE
Payer: COMMERCIAL

## 2021-09-06 VITALS
HEART RATE: 91 BPM | HEIGHT: 69 IN | OXYGEN SATURATION: 99 % | SYSTOLIC BLOOD PRESSURE: 169 MMHG | DIASTOLIC BLOOD PRESSURE: 100 MMHG | BODY MASS INDEX: 32.58 KG/M2 | RESPIRATION RATE: 20 BRPM | WEIGHT: 220 LBS | TEMPERATURE: 98.9 F

## 2021-09-06 DIAGNOSIS — K57.32 DIVERTICULITIS OF COLON: Primary | ICD-10-CM

## 2021-09-06 LAB
-: ABNORMAL
AMORPHOUS: ABNORMAL
BACTERIA: ABNORMAL
BILIRUBIN URINE: NEGATIVE
CASTS UA: ABNORMAL /LPF
COLOR: YELLOW
COMMENT UA: ABNORMAL
CRYSTALS, UA: ABNORMAL /HPF
EPITHELIAL CELLS UA: ABNORMAL /HPF (ref 0–5)
GLUCOSE URINE: NEGATIVE
KETONES, URINE: NEGATIVE
LEUKOCYTE ESTERASE, URINE: NEGATIVE
MUCUS: ABNORMAL
NITRITE, URINE: NEGATIVE
OTHER OBSERVATIONS UA: ABNORMAL
PH UA: 6 (ref 5–8)
PROTEIN UA: NEGATIVE
RBC UA: ABNORMAL /HPF (ref 0–2)
RENAL EPITHELIAL, UA: ABNORMAL /HPF
SPECIFIC GRAVITY UA: 1.02 (ref 1–1.03)
TRICHOMONAS: ABNORMAL
TURBIDITY: ABNORMAL
URINE HGB: ABNORMAL
UROBILINOGEN, URINE: NORMAL
WBC UA: ABNORMAL /HPF (ref 0–5)
YEAST: ABNORMAL

## 2021-09-06 PROCEDURE — 99283 EMERGENCY DEPT VISIT LOW MDM: CPT

## 2021-09-06 PROCEDURE — 81001 URINALYSIS AUTO W/SCOPE: CPT

## 2021-09-06 PROCEDURE — 74176 CT ABD & PELVIS W/O CONTRAST: CPT

## 2021-09-06 RX ORDER — CIPROFLOXACIN 500 MG/1
500 TABLET, FILM COATED ORAL 2 TIMES DAILY
Qty: 14 TABLET | Refills: 0 | Status: SHIPPED | OUTPATIENT
Start: 2021-09-06 | End: 2021-09-13

## 2021-09-06 RX ORDER — METRONIDAZOLE 500 MG/1
500 TABLET ORAL 3 TIMES DAILY
Qty: 21 TABLET | Refills: 0 | Status: SHIPPED | OUTPATIENT
Start: 2021-09-06 | End: 2021-09-13

## 2021-09-06 RX ORDER — AMLODIPINE BESYLATE 5 MG/1
5 TABLET ORAL DAILY
Qty: 30 TABLET | Refills: 0 | Status: SHIPPED | OUTPATIENT
Start: 2021-09-06

## 2021-09-06 ASSESSMENT — ENCOUNTER SYMPTOMS
WHEEZING: 0
SINUS PRESSURE: 0
COUGH: 0
NAUSEA: 0
VOMITING: 0
SORE THROAT: 0
SHORTNESS OF BREATH: 0
RHINORRHEA: 0
COLOR CHANGE: 0
CONSTIPATION: 0
DIARRHEA: 0
ABDOMINAL PAIN: 0

## 2021-09-06 ASSESSMENT — PAIN DESCRIPTION - PAIN TYPE: TYPE: ACUTE PAIN

## 2021-09-06 ASSESSMENT — PAIN DESCRIPTION - ORIENTATION: ORIENTATION: LOWER

## 2021-09-06 ASSESSMENT — PAIN DESCRIPTION - LOCATION: LOCATION: ABDOMEN

## 2021-09-06 ASSESSMENT — PAIN SCALES - GENERAL: PAINLEVEL_OUTOF10: 6

## 2021-09-06 NOTE — ED PROVIDER NOTES
[facility] ED  eMERGENCY dEPARTMENT eNCOUnter      Pt Name: Kayla Dowd  MRN: [id number]  [place] 1980  Date of evaluation: 2021  Provider: [address] [place] [address] NP, GIANNA Venegas [id number]       Chief Complaint   Patient presents with    Abdominal Pain     across lower abdomen for the past couple of days. Pt denies NVD          HISTORY OF PRESENT ILLNESS  (Location/Symptom, Timing/Onset, Context/Setting, Quality, Duration, Modifying Factors, Severity.)   Kayla Dowd is a 36 y.o. male who presents to the emergency department private vehicle for evaluation of abdominal pain. Patient has a suprapubic abdominal pain that started a couple of days ago. He denies any associated nausea vomiting and diarrhea. He states he may have a little bit of urinary frequency. He states he has diverticulitis in the past but this does not feel like that. He denies any fevers or chills. He rates his pain a 6 on a 0-to-10 scale. Nursing Notes were reviewed. ALLERGIES     Patient has no known allergies. CURRENT MEDICATIONS       Previous Medications    TRIAMTERENE-HYDROCHLOROTHIAZIDE (MAXZIDE) 75-50 MG PER TABLET    Take 1 tablet by mouth daily       PAST MEDICAL HISTORY         Diagnosis Date    Back pain     on 19 pt denies pain mgmnt    Diverticulitis     Eczema     HTN (hypertension)     Psoriasis        SURGICAL HISTORY     History reviewed. No pertinent surgical history. FAMILY HISTORY           Problem Relation Age of Onset    Cancer Mother         colon cancer-  54y/o    Diabetes Maternal Grandmother      Family Status   Relation Name Status    Mother  (Not Specified)    MGM  (Not Specified)        SOCIAL HISTORY      reports that he has quit smoking. His smoking use included cigars and cigarettes. He has a 10.00 pack-year smoking history. He has never used smokeless tobacco. He reports current alcohol use. He reports that he does not use drugs.     REVIEW OF SYSTEMS    (2-9 systems for level 4, 10 or more for level 5)     Review of Systems   Constitutional: Negative for chills, fever and unexpected weight change. HENT: Negative for congestion, rhinorrhea, sinus pressure and sore throat. Respiratory: Negative for cough, shortness of breath and wheezing. Cardiovascular: Negative for chest pain and palpitations. Gastrointestinal: Negative for abdominal pain, constipation, diarrhea, nausea and vomiting. Genitourinary: Negative for dysuria and hematuria. Musculoskeletal: Negative for arthralgias and myalgias. Skin: Negative for color change and rash. Neurological: Negative for dizziness, weakness and headaches. Hematological: Negative for adenopathy. All other systems reviewed and are negative. Except as noted above the remainder of the review of systems was reviewed and negative. PHYSICAL EXAM    (up to 7 for level 4, 8 or more for level 5)     ED Triage Vitals [09/06/21 1732]   BP Temp Temp Source Pulse Resp SpO2 Height Weight   (!) 169/100 98.9 °F (37.2 °C) Oral 91 20 99 % 5' 9\" (1.753 m) 220 lb (99.8 kg)       Physical Exam  Vitals reviewed. Constitutional:       Appearance: He is well-developed. HENT:      Head: Normocephalic and atraumatic. Eyes:      Conjunctiva/sclera: Conjunctivae normal.      Pupils: Pupils are equal, round, and reactive to light. Cardiovascular:      Rate and Rhythm: Normal rate and regular rhythm. Pulmonary:      Effort: Pulmonary effort is normal. No respiratory distress. Breath sounds: Normal breath sounds. No stridor. Abdominal:      General: Bowel sounds are normal.      Palpations: Abdomen is soft. Tenderness: There is abdominal tenderness in the suprapubic area. Musculoskeletal:         General: Normal range of motion. Cervical back: Normal range of motion and neck supple. Lymphadenopathy:      Cervical: No cervical adenopathy. Skin:     General: Skin is warm and dry. Findings: No rash. Neurological:      Mental Status: He is alert and oriented to person, place, and time. RADIOLOGY:   Non-plain film images such as CT, Ultrasound and MRI are read by the radiologist. Plain radiographic images are visualized and preliminarily interpreted by the emergency physician with the below findings:    CT ABDOMEN PELVIS WO CONTRAST Additional Contrast? None    Result Date: 9/6/2021  EXAMINATION: CT OF THE ABDOMEN AND PELVIS WITHOUT CONTRAST 9/6/2021 6:34 pm TECHNIQUE: CT of the abdomen and pelvis was performed without the administration of intravenous contrast. Multiplanar reformatted images are provided for review. Dose modulation, iterative reconstruction, and/or weight based adjustment of the mA/kV was utilized to reduce the radiation dose to as low as reasonably achievable. COMPARISON: 10/04/2011 HISTORY: ORDERING SYSTEM PROVIDED HISTORY: [facility] pain TECHNOLOGIST PROVIDED HISTORY: abd pain Decision Support Exception - unselect if not a suspected or confirmed emergency medical condition->Emergency Medical Condition (MA) Reason for Exam: Lower abd pain x several days, hx diverticulitis Acuity: Acute Type of Exam: Initial FINDINGS: Lower Chest: Few bands of bibasilar atelectasis. Heart size is normal. Organs: The gallbladder is contracted. No calcified gallstones. No pericholecystic fluid. The liver, spleen, pancreas, adrenal glands and kidneys are grossly normal with the limitations of a noncontrast study. GI/Bowel: Left colon diverticulosis with moderate diverticulosis in the sigmoid region. There is acute short segment diverticulitis in the proximal sigmoid colon with bowel wall thickening and pericolonic fat stranding. There is no drainable fluid collection/abscess. The appendix is normal.  No bowel obstruction. Pelvis: Urinary bladder is unremarkable. There is a small fat containing left inguinal hernia.  Peritoneum/Retroperitoneum: Fat containing umbilical hernia similar to the prior study. There is no adenopathy, free air or free fluid. Bones/Soft Tissues: No acute bone or soft tissue abnormality. Acute short segment uncomplicated proximal sigmoid colon diverticulitis. Unchanged small fat containing umbilical hernia. Small fat containing left inguinal hernia. Interpretation per the Radiologist below, if available at the time of this note:    CT ABDOMEN PELVIS WO CONTRAST Additional Contrast? None   Final Result   Acute short segment uncomplicated proximal sigmoid colon diverticulitis. Unchanged small fat containing umbilical hernia. Small fat containing left   inguinal hernia. LABS:  Labs Reviewed   URINE RT REFLEX TO CULTURE - Abnormal; Notable for the following components:       Result Value    Turbidity UA SLIGHTLY CLOUDY (*)     Urine Hgb TRACE (*)     All other components within normal limits   MICROSCOPIC URINALYSIS - Abnormal; Notable for the following components:    Mucus, UA 1+ (*)     Amorphous, UA 3+ (*)     All other components within normal limits       All other labs were within normal range or not returned as of this dictation. EMERGENCY DEPARTMENT COURSE and DIFFERENTIAL DIAGNOSIS/MDM:   Vitals:    Vitals:    09/06/21 1732   BP: (!) 169/100   Pulse: 91   Resp: 20   Temp: 98.9 °F (37.2 °C)   TempSrc: Oral   SpO2: 99%   Weight: 220 lb (99.8 kg)   Height: 5' 9\" (1.753 m)       Medical Decision Making: Patient is hard of diverticulitis. Be discharged home on Cipro Flagyl. He has a refill on his prescription for Norvasc for his blood pressure which was provided. Follow-up with primary care physician. Return for any worsening belly pain, fever, vomiting or any other concerns  FINAL IMPRESSION      1.  Diverticulitis of colon          DISPOSITION/PLAN   DISPOSITION Decision To Discharge 09/06/2021 07:44:41 PM      PATIENT REFERRED TO:   same day PCP  [phone]          DISCHARGE MEDICATIONS:     New Prescriptions    AMLODIPINE (NORVASC) 5 MG TABLET    Take 1 tablet by mouth daily    CIPROFLOXACIN (CIPRO) 500 MG TABLET    Take 1 tablet by mouth 2 times daily for 7 days    METRONIDAZOLE (FLAGYL) 500 MG TABLET    Take 1 tablet by mouth 3 times daily for 7 days           (Please note that portions of this note were completed with a voice recognition program.  Efforts were made to edit the dictations but occasionally words are mis-transcribed.)    [id number] [facility] JHONNY, GIANNA - CNP  Certified Nurse Practitioner          GIANNA Mendoza CNP  09/06/21 [id number]

## 2021-09-06 NOTE — ED PROVIDER NOTES
eMERGENCY dEPARTMENT eNCOUnter   [address] Name: Danelle Benitez  MRN: [id number]  [place] 1980  Date of evaluation: 9/6/21     Danelle Benitez is a 36 y.o. male with CC: Abdominal Pain (across lower abdomen for the past couple of days. Pt denies NVD )      Based on the medical record the care appears appropriate. I was personally available for consultation in the Emergency Department.     Tho Chen DO  Attending Emergency Physician                    Suri Smith 1721,   09/06/21 1944

## 2021-10-22 ENCOUNTER — APPOINTMENT (OUTPATIENT)
Dept: CT IMAGING | Age: 41
End: 2021-10-22
Payer: COMMERCIAL

## 2021-10-22 ENCOUNTER — HOSPITAL ENCOUNTER (EMERGENCY)
Age: 41
Discharge: HOME | End: 2021-10-22
Attending: STUDENT IN AN ORGANIZED HEALTH CARE EDUCATION/TRAINING PROGRAM
Payer: COMMERCIAL

## 2021-10-22 VITALS
HEART RATE: 88 BPM | BODY MASS INDEX: 32.58 KG/M2 | WEIGHT: 220 LBS | DIASTOLIC BLOOD PRESSURE: 117 MMHG | OXYGEN SATURATION: 100 % | TEMPERATURE: 98.5 F | RESPIRATION RATE: 18 BRPM | HEIGHT: 69 IN | SYSTOLIC BLOOD PRESSURE: 162 MMHG

## 2021-10-22 DIAGNOSIS — K57.32 DIVERTICULITIS OF COLON: Primary | ICD-10-CM

## 2021-10-22 LAB
ABSOLUTE EOS #: 0.11 K/UL (ref 0–0.44)
ABSOLUTE IMMATURE GRANULOCYTE: 0.11 K/UL (ref 0–0.3)
ABSOLUTE LYMPH #: 1.76 K/UL (ref 1.1–3.7)
ABSOLUTE MONO #: 0.94 K/UL (ref 0.1–1.2)
ALBUMIN SERPL-MCNC: 4.6 G/DL (ref 3.5–5.2)
ALBUMIN/GLOBULIN RATIO: ABNORMAL (ref 1–2.5)
ALP BLD-CCNC: 97 U/L (ref 40–129)
ALT SERPL-CCNC: 17 U/L (ref 5–41)
ANION GAP SERPL CALCULATED.3IONS-SCNC: 12 MMOL/L (ref 9–17)
AST SERPL-CCNC: 22 U/L
BASOPHILS # BLD: 0 % (ref 0–2)
BASOPHILS ABSOLUTE: 0.05 K/UL (ref 0–0.2)
BILIRUB SERPL-MCNC: 0.46 MG/DL (ref 0.3–1.2)
BUN BLDV-MCNC: 12 MG/DL (ref 6–20)
BUN/CREAT BLD: 17 (ref 9–20)
CALCIUM SERPL-MCNC: 9.6 MG/DL (ref 8.6–10.4)
CHLORIDE BLD-SCNC: 101 MMOL/L (ref 98–107)
CO2: 23 MMOL/L (ref 20–31)
CREAT SERPL-MCNC: 0.72 MG/DL (ref 0.7–1.2)
DIFFERENTIAL TYPE: ABNORMAL
EOSINOPHILS RELATIVE PERCENT: 1 % (ref 1–4)
GFR AFRICAN AMERICAN: >60 ML/MIN
GFR NON-AFRICAN AMERICAN: >60 ML/MIN
GFR SERPL CREATININE-BSD FRML MDRD: ABNORMAL ML/MIN/{1.73_M2}
GFR SERPL CREATININE-BSD FRML MDRD: ABNORMAL ML/MIN/{1.73_M2}
GLUCOSE BLD-MCNC: 107 MG/DL (ref 70–99)
HCT VFR BLD CALC: 43.6 % (ref 40.7–50.3)
HEMOGLOBIN: 13.9 G/DL (ref 13–17)
IMMATURE GRANULOCYTES: 1 %
LACTIC ACID, SEPSIS WHOLE BLOOD: NORMAL MMOL/L (ref 0.5–1.9)
LACTIC ACID, SEPSIS: 0.7 MMOL/L (ref 0.5–1.9)
LIPASE: 24 U/L (ref 13–60)
LYMPHOCYTES # BLD: 10 % (ref 24–43)
MCH RBC QN AUTO: 28.6 PG (ref 25.2–33.5)
MCHC RBC AUTO-ENTMCNC: 31.9 G/DL (ref 28.4–34.8)
MCV RBC AUTO: 89.7 FL (ref 82.6–102.9)
MONOCYTES # BLD: 5 % (ref 3–12)
NRBC AUTOMATED: 0 PER 100 WBC
PDW BLD-RTO: 12.8 % (ref 11.8–14.4)
PLATELET # BLD: 270 K/UL (ref 138–453)
PLATELET ESTIMATE: ABNORMAL
PMV BLD AUTO: 9.9 FL (ref 8.1–13.5)
POTASSIUM SERPL-SCNC: 4.6 MMOL/L (ref 3.7–5.3)
RBC # BLD: 4.86 M/UL (ref 4.21–5.77)
RBC # BLD: ABNORMAL 10*6/UL
SEG NEUTROPHILS: 83 % (ref 36–65)
SEGMENTED NEUTROPHILS ABSOLUTE COUNT: 15.23 K/UL (ref 1.5–8.1)
SODIUM BLD-SCNC: 136 MMOL/L (ref 135–144)
TOTAL PROTEIN: 7.3 G/DL (ref 6.4–8.3)
WBC # BLD: 18.2 K/UL (ref 3.5–11.3)
WBC # BLD: ABNORMAL 10*3/UL

## 2021-10-22 PROCEDURE — 6370000000 HC RX 637 (ALT 250 FOR IP): Performed by: STUDENT IN AN ORGANIZED HEALTH CARE EDUCATION/TRAINING PROGRAM

## 2021-10-22 PROCEDURE — 83605 ASSAY OF LACTIC ACID: CPT

## 2021-10-22 PROCEDURE — 6360000004 HC RX CONTRAST MEDICATION: Performed by: STUDENT IN AN ORGANIZED HEALTH CARE EDUCATION/TRAINING PROGRAM

## 2021-10-22 PROCEDURE — 85025 COMPLETE CBC W/AUTO DIFF WBC: CPT

## 2021-10-22 PROCEDURE — 2580000003 HC RX 258: Performed by: STUDENT IN AN ORGANIZED HEALTH CARE EDUCATION/TRAINING PROGRAM

## 2021-10-22 PROCEDURE — 99283 EMERGENCY DEPT VISIT LOW MDM: CPT

## 2021-10-22 PROCEDURE — 6360000002 HC RX W HCPCS: Performed by: STUDENT IN AN ORGANIZED HEALTH CARE EDUCATION/TRAINING PROGRAM

## 2021-10-22 PROCEDURE — 96374 THER/PROPH/DIAG INJ IV PUSH: CPT

## 2021-10-22 PROCEDURE — 96375 TX/PRO/DX INJ NEW DRUG ADDON: CPT

## 2021-10-22 PROCEDURE — 83690 ASSAY OF LIPASE: CPT

## 2021-10-22 PROCEDURE — 74177 CT ABD & PELVIS W/CONTRAST: CPT

## 2021-10-22 PROCEDURE — 80053 COMPREHEN METABOLIC PANEL: CPT

## 2021-10-22 PROCEDURE — 36415 COLL VENOUS BLD VENIPUNCTURE: CPT

## 2021-10-22 RX ORDER — AMOXICILLIN AND CLAVULANATE POTASSIUM 875; 125 MG/1; MG/1
1 TABLET, FILM COATED ORAL ONCE
Status: COMPLETED | OUTPATIENT
Start: 2021-10-22 | End: 2021-10-22

## 2021-10-22 RX ORDER — MORPHINE SULFATE 4 MG/ML
4 INJECTION, SOLUTION INTRAMUSCULAR; INTRAVENOUS ONCE
Status: COMPLETED | OUTPATIENT
Start: 2021-10-22 | End: 2021-10-22

## 2021-10-22 RX ORDER — ACETAMINOPHEN 325 MG/1
650 TABLET ORAL ONCE
Status: COMPLETED | OUTPATIENT
Start: 2021-10-22 | End: 2021-10-22

## 2021-10-22 RX ORDER — OXYCODONE HYDROCHLORIDE AND ACETAMINOPHEN 5; 325 MG/1; MG/1
1 TABLET ORAL EVERY 6 HOURS PRN
Qty: 20 TABLET | Refills: 0 | Status: SHIPPED | OUTPATIENT
Start: 2021-10-22 | End: 2021-10-27

## 2021-10-22 RX ORDER — AMOXICILLIN AND CLAVULANATE POTASSIUM 875; 125 MG/1; MG/1
1 TABLET, FILM COATED ORAL 2 TIMES DAILY
Qty: 20 TABLET | Refills: 0 | Status: SHIPPED | OUTPATIENT
Start: 2021-10-22 | End: 2021-11-01

## 2021-10-22 RX ORDER — 0.9 % SODIUM CHLORIDE 0.9 %
80 INTRAVENOUS SOLUTION INTRAVENOUS ONCE
Status: COMPLETED | OUTPATIENT
Start: 2021-10-22 | End: 2021-10-22

## 2021-10-22 RX ORDER — OXYCODONE HYDROCHLORIDE AND ACETAMINOPHEN 5; 325 MG/1; MG/1
1 TABLET ORAL ONCE
Status: COMPLETED | OUTPATIENT
Start: 2021-10-22 | End: 2021-10-22

## 2021-10-22 RX ORDER — ONDANSETRON 4 MG/1
4 TABLET, ORALLY DISINTEGRATING ORAL EVERY 8 HOURS PRN
Qty: 12 TABLET | Refills: 0 | Status: SHIPPED | OUTPATIENT
Start: 2021-10-22

## 2021-10-22 RX ORDER — SODIUM CHLORIDE 0.9 % (FLUSH) 0.9 %
10 SYRINGE (ML) INJECTION PRN
Status: DISCONTINUED | OUTPATIENT
Start: 2021-10-22 | End: 2021-10-22 | Stop reason: HOSPADM

## 2021-10-22 RX ORDER — ACETAMINOPHEN 500 MG
1000 TABLET ORAL EVERY 6 HOURS PRN
Qty: 30 TABLET | Refills: 0 | Status: SHIPPED | OUTPATIENT
Start: 2021-10-22

## 2021-10-22 RX ORDER — ONDANSETRON 2 MG/ML
4 INJECTION INTRAMUSCULAR; INTRAVENOUS ONCE
Status: COMPLETED | OUTPATIENT
Start: 2021-10-22 | End: 2021-10-22

## 2021-10-22 RX ADMIN — IOPAMIDOL 75 ML: 755 INJECTION, SOLUTION INTRAVENOUS at 04:13

## 2021-10-22 RX ADMIN — SODIUM CHLORIDE, PRESERVATIVE FREE 10 ML: 5 INJECTION INTRAVENOUS at 04:13

## 2021-10-22 RX ADMIN — AMOXICILLIN AND CLAVULANATE POTASSIUM 1 TABLET: 875; 125 TABLET, FILM COATED ORAL at 05:09

## 2021-10-22 RX ADMIN — ONDANSETRON 4 MG: 2 INJECTION INTRAMUSCULAR; INTRAVENOUS at 01:33

## 2021-10-22 RX ADMIN — OXYCODONE AND ACETAMINOPHEN 1 TABLET: 5; 325 TABLET ORAL at 05:09

## 2021-10-22 RX ADMIN — MORPHINE SULFATE 4 MG: 4 INJECTION INTRAVENOUS at 01:33

## 2021-10-22 RX ADMIN — SODIUM CHLORIDE 80 ML: 9 INJECTION, SOLUTION INTRAVENOUS at 04:13

## 2021-10-22 RX ADMIN — ACETAMINOPHEN 650 MG: 325 TABLET ORAL at 05:09

## 2021-10-22 ASSESSMENT — ENCOUNTER SYMPTOMS
EYE REDNESS: 0
NAUSEA: 1
EYE DISCHARGE: 0
DIARRHEA: 0
SHORTNESS OF BREATH: 0
VOMITING: 0
ABDOMINAL PAIN: 1
BACK PAIN: 0

## 2021-10-22 ASSESSMENT — PAIN DESCRIPTION - LOCATION: LOCATION: ABDOMEN

## 2021-10-22 ASSESSMENT — PAIN DESCRIPTION - PAIN TYPE: TYPE: ACUTE PAIN

## 2021-10-22 ASSESSMENT — PAIN SCALES - GENERAL
PAINLEVEL_OUTOF10: 7
PAINLEVEL_OUTOF10: 10
PAINLEVEL_OUTOF10: 10

## 2021-10-22 NOTE — ED PROVIDER NOTES
[facility]  Emergency Department Encounter     Pt Name: Tomás Verma  MRN: [id number]  [place] 1980  Date of evaluation: 10/22/21  PCP:  No primary care provider on file. CHIEF COMPLAINT       Chief Complaint   Patient presents with    Abdominal Pain     started yesterday       HISTORY OFPRESENT ILLNESS  (Location/Symptom, Timing/Onset, Context/Setting, Quality, Duration, Modifying Factors,Severity.)      Tomás Verma is a 39 y.o. male who presents with left lower quadrant abdominal pain with nausea. Does have a history of diverticulitis and was seen for similar complaints approximately half ago. States he has been watching his diet and has been doing well till yesterday. Developed worsening left lower quadrant abdominal pain, denies any vomiting, diarrhea fevers. Has not had a colonoscopy yet. Pain is severe. Left lower quadrant. Worse with palpation and movement. PAST MEDICAL / SURGICAL / SOCIAL / FAMILY HISTORY      has a past medical history of Back pain, Diverticulitis, Eczema, HTN (hypertension), and Psoriasis. has no past surgical history on file.     Social History     Socioeconomic History    Marital status: Single     Spouse name: Not on file    Number of children: Not on file    Years of education: Not on file    Highest education level: Not on file   Occupational History    Not on file   Tobacco Use    Smoking status: Former Smoker     Packs/day: 0.50     Years: 20.00     Pack years: 10.00     Types: Cigars, Cigarettes    Smokeless tobacco: Never Used   Vaping Use    Vaping Use: Never used   Substance and Sexual Activity    Alcohol use: Yes     Comment: occasionally    Drug use: No    Sexual activity: Yes     Partners: Female   Other Topics Concern    Not on file   Social History Narrative    Not on file     Social Determinants of Health     Financial Resource Strain: Low Risk     Difficulty of Paying Living Expenses: Not hard at all   Food Insecurity: No Food Insecurity    Worried About Running Out of Food in the Last Year: Never true    Ran Out of Food in the Last Year: Never true   Transportation Needs: No Transportation Needs    Lack of Transportation (Medical): No    Lack of Transportation (Non-Medical): No   Physical Activity:     Days of Exercise per Week:     Minutes of Exercise per Session:    Stress:     Feeling of Stress :    Social Connections:     Frequency of Communication with Friends and Family:     Frequency of Social Gatherings with Friends and Family:     Attends Jew Services:     Active Member of Clubs or Organizations:     Attends Club or Organization Meetings:     Marital Status:    Intimate Partner Violence:     Fear of Current or Ex-Partner:     Emotionally Abused:     Physically Abused:     Sexually Abused:        Family History   Problem Relation Age of Onset    Cancer Mother         colon cancer-  54y/o    Diabetes Maternal Grandmother        Allergies:  Patient has no known allergies. Home Medications:  Prior to Admission medications    Medication Sig Start Date End Date Taking? Authorizing Provider   acetaminophen (APAP EXTRA STRENGTH) 500 MG tablet Take 2 tablets by mouth every 6 hours as needed for Pain 10/22/21  Yes Rosemary Yao DO   oxyCODONE-acetaminophen (PERCOCET) 5-325 MG per tablet Take 1 tablet by mouth every 6 hours as needed for Pain for up to 5 days. Intended supply: 3 days.  Take lowest dose possible to manage pain 10/22/21 10/27/21 Yes Rosemary Yao DO   ondansetron (ZOFRAN ODT) 4 MG disintegrating tablet Take 1 tablet by mouth every 8 hours as needed for Nausea 10/22/21  Yes Rosemary Yao DO   amoxicillin-clavulanate (AUGMENTIN) 875-125 MG per tablet Take 1 tablet by mouth 2 times daily for 10 days 10/22/21 11/1/21 Yes Rosemary Yao DO   amLODIPine (NORVASC) 5 MG tablet Take 1 tablet by mouth daily 21   Librado Sharma APRN - CNP triamterene-hydroCHLOROthiazide (MAXZIDE) 75-50 MG per tablet Take 1 tablet by mouth daily 4/19/21   Eliezer Sanderson MD   ibuprofen (ADVIL;MOTRIN) 600 MG tablet Take 1 tablet by mouth 4 times daily as needed for Pain  Patient not taking: Reported on 11/23/2020 2/3/20 3/1/21  Freddie Hays MD       REVIEW OF SYSTEMS    (2-9 systems for level 4, 10 or more for level 5)      Review of Systems   Constitutional: Negative for chills and fever. Eyes: Negative for discharge and redness. Respiratory: Negative for shortness of breath. Cardiovascular: Negative for chest pain. Gastrointestinal: Positive for abdominal pain and nausea. Negative for diarrhea and vomiting. Genitourinary: Negative for flank pain. Musculoskeletal: Negative for back pain. Skin: Negative for rash. Neurological: Negative for headaches. Psychiatric/Behavioral: Negative for agitation and confusion. PHYSICAL EXAM   (up to 7 for level 4, 8 or more for level 5)     INITIAL VITALS:    height is 5' 9\" (1.753 m) and weight is 220 lb (99.8 kg). His oral temperature is 98.5 °F (36.9 °C). His blood pressure is 162/117 (abnormal) and his pulse is 88. His respiration is 18 and oxygen saturation is 100%. Physical Exam  Vitals and nursing note reviewed. Constitutional:       Appearance: He is well-developed. HENT:      Head: Normocephalic and atraumatic. Nose: Nose normal.      Mouth/Throat:      Mouth: Mucous membranes are moist.   Eyes:      General: No scleral icterus. Conjunctiva/sclera: Conjunctivae normal.      Pupils: Pupils are equal, round, and reactive to light. Neck:      Trachea: No tracheal deviation. Cardiovascular:      Rate and Rhythm: Normal rate and regular rhythm. Heart sounds: Normal heart sounds. No murmur heard. No friction rub. No gallop. Pulmonary:      Effort: Pulmonary effort is normal. No respiratory distress. Breath sounds: Normal breath sounds. No wheezing or rales.    Abdominal: General: There is no distension. Palpations: Abdomen is soft. There is no mass. Tenderness: There is abdominal tenderness. There is guarding. Hernia: No hernia is present. Comments: Marked tenderness and voluntary guarding to left lower quadrant   Musculoskeletal:         General: Normal range of motion. Cervical back: Neck supple. Skin:     General: Skin is warm and dry. Findings: No erythema or rash. Neurological:      Mental Status: He is alert and oriented to person, place, and time. Psychiatric:         Behavior: Behavior normal.         DIFFERENTIAL  DIAGNOSIS     PLAN (LABS / IMAGING / EKG):  Orders Placed This Encounter   Procedures    CT ABDOMEN PELVIS W IV CONTRAST Additional Contrast? None    CBC Auto Differential    Comprehensive Metabolic Panel    Lipase    Lactate, Sepsis       MEDICATIONS ORDERED:  Orders Placed This Encounter   Medications    morphine sulfate (PF) injection 4 mg    ondansetron (ZOFRAN) injection 4 mg    0.9 % sodium chloride bolus    iopamidol (ISOVUE-370) 76 % injection 75 mL    DISCONTD: sodium chloride flush 0.9 % injection 10 mL    amoxicillin-clavulanate (AUGMENTIN) 875-125 MG per tablet 1 tablet     Order Specific Question:   Antimicrobial Indications     Answer: Other     Order Specific Question:   Other Abx Indication     Answer:   Diverticulitis     Order Specific Question:   Suspected Organism(s)     Answer:   Diverticulitis    oxyCODONE-acetaminophen (PERCOCET) 5-325 MG per tablet 1 tablet    acetaminophen (TYLENOL) tablet 650 mg    acetaminophen (APAP EXTRA STRENGTH) 500 MG tablet     Sig: Take 2 tablets by mouth every 6 hours as needed for Pain     Dispense:  30 tablet     Refill:  0    oxyCODONE-acetaminophen (PERCOCET) 5-325 MG per tablet     Sig: Take 1 tablet by mouth every 6 hours as needed for Pain for up to 5 days. Intended supply: 3 days.  Take lowest dose possible to manage pain     Dispense:  20 tablet Refill:  0    ondansetron (ZOFRAN ODT) 4 MG disintegrating tablet     Sig: Take 1 tablet by mouth every 8 hours as needed for Nausea     Dispense:  12 tablet     Refill:  0    amoxicillin-clavulanate (AUGMENTIN) 875-125 MG per tablet     Sig: Take 1 tablet by mouth 2 times daily for 10 days     Dispense:  20 tablet     Refill:  0       DDX: Diverticulitis versus perforation versus abscess    Initial MDM/Plan: 39 y.o. male who presents with left lower quadrant abdominal pain. Vital signs otherwise negative. Does appear uncomfortable however nontoxic. Give pain medication. CT scan assess for abscess versus perforation. Otherwise reassess if able to tolerate p.o. Possible discharge. DIAGNOSTIC RESULTS / EMERGENCY DEPARTMENT COURSE / MDM     LABS:  Labs Reviewed   CBC WITH AUTO DIFFERENTIAL - Abnormal; Notable for the following components:       Result Value    WBC 18.2 (*)     Seg Neutrophils 83 (*)     Lymphocytes 10 (*)     Immature Granulocytes 1 (*)     Segs Absolute 15.23 (*)     All other components within normal limits   COMPREHENSIVE METABOLIC PANEL - Abnormal; Notable for the following components:    Glucose 107 (*)     All other components within normal limits   LIPASE   LACTATE, SEPSIS         RADIOLOGY:  CT ABDOMEN PELVIS W IV CONTRAST Additional Contrast? None    Result Date: 10/22/2021  EXAMINATION: CT OF THE ABDOMEN AND PELVIS WITH CONTRAST 10/22/2021 4:09 am TECHNIQUE: CT of the abdomen and pelvis was performed with the administration of intravenous contrast. Multiplanar reformatted images are provided for review. Dose modulation, iterative reconstruction, and/or weight based adjustment of the mA/kV was utilized to reduce the radiation dose to as low as reasonably achievable.  COMPARISON: CT abdomen and pelvis without contrast 09/06/2021 HISTORY: ORDERING SYSTEM PROVIDED HISTORY: History of diverticulitis, left lower quadrant abdominal pain TECHNOLOGIST PROVIDED HISTORY: History of diverticulitis, left lower quadrant abdominal pain Decision Support Exception - unselect if not a suspected or confirmed emergency medical condition->Emergency Medical Condition (MA) Reason for Exam: History of diverticulitis, left lower quadrant abdominal pain Acuity: Acute Type of Exam: Initial FINDINGS: Abdomen/Pelvis: Lower chest: The lung bases are well aerated. Pleural surfaces are unremarkable and no evidence of pleural effusion is identified. Organs: Mild diffuse fatty infiltration of the liver is seen. Bilateral renal small simple cysts are present. The liver, gallbladder, spleen, pancreas, adrenal glands, kidneys, are otherwise unremarkable in appearance. GI/Bowel: The stomach is unremarkable without wall thickening or distention. Numerous diverticula seen scattered within the descending and sigmoid colon with adjacent fat stranding within the mesentery adjacent to the distal descending and proximal sigmoid colon with minimal fluid collecting within the paracolic gutter and associated wall thickening and edema is seen. Bowel loops are otherwise unremarkable in appearance without evidence of obstruction, distension or mucosal thickening. Pelvis: The urinary bladder is well distended and unremarkable in appearance. No evidence of pelvic free fluid is seen. Peritoneum/Retroperitoneum: No evidence of retroperitoneal or intraperitoneal lymphadenopathy is identified. No evidence of intraperitoneal free fluid is seen. Bones/Soft Tissues: Small umbilical omental hernia is again seen. The bones, skeletal muscle bundles, fascial planes and subcutaneous soft tissues are otherwise unremarkable in appearance. 1. Marked descending and sigmoid colon diverticulosis with interval mild worsening of distal descending and proximal sigmoid colon diverticulitis, as discussed above. 2. Mild diffuse hepatic steatosis.          EMERGENCY DEPARTMENT COURSE:  ED Course as of Oct 22 1717   Fri Oct 22, 2021   0315 WBC(!): Intended supply: 3 days.  Take lowest dose possible to manage pain, Disp-20 tablet, R-0Print      ondansetron (ZOFRAN ODT) 4 MG disintegrating tablet Take 1 tablet by mouth every 8 hours as needed for Nausea, Disp-12 tablet, R-0Print      amoxicillin-clavulanate (AUGMENTIN) 875-125 MG per tablet Take 1 tablet by mouth 2 times daily for 10 days, Disp-20 tablet, R-0Print             Emy Ambriz DO  EmergencyMedicine Attending    (Please note that portions of this note were completed with a voice recognition program.  Efforts were made to edit the dictations but occasionally words are mis-transcribed.)       Emy Ambriz DO  10/22/21 2315

## 2021-10-22 NOTE — Clinical Note
Edyta Verde was seen and treated in our emergency department on 10/21/2021. He may return to work on 10/25/2021. May return sooner if feeling improved. If you have any questions or concerns, please don't hesitate to call.       Rosemary Yao, DO

## 2021-10-26 ENCOUNTER — APPOINTMENT (OUTPATIENT)
Dept: GENERAL RADIOLOGY | Age: 41
End: 2021-10-26
Payer: COMMERCIAL

## 2021-10-26 ENCOUNTER — HOSPITAL ENCOUNTER (EMERGENCY)
Age: 41
Discharge: HOME | End: 2021-10-27
Attending: EMERGENCY MEDICINE
Payer: COMMERCIAL

## 2021-10-26 VITALS
DIASTOLIC BLOOD PRESSURE: 113 MMHG | SYSTOLIC BLOOD PRESSURE: 161 MMHG | TEMPERATURE: 98.8 F | WEIGHT: 220 LBS | HEIGHT: 69 IN | HEART RATE: 94 BPM | BODY MASS INDEX: 32.58 KG/M2 | RESPIRATION RATE: 18 BRPM | OXYGEN SATURATION: 100 %

## 2021-10-26 DIAGNOSIS — R10.32 ABDOMINAL PAIN, LEFT LOWER QUADRANT: Primary | ICD-10-CM

## 2021-10-26 LAB
ABSOLUTE EOS #: 0.26 K/UL (ref 0–0.44)
ABSOLUTE IMMATURE GRANULOCYTE: 0.09 K/UL (ref 0–0.3)
ABSOLUTE LYMPH #: 1.88 K/UL (ref 1.1–3.7)
ABSOLUTE MONO #: 0.94 K/UL (ref 0.1–1.2)
ALBUMIN SERPL-MCNC: 3.9 G/DL (ref 3.5–5.2)
ALBUMIN/GLOBULIN RATIO: ABNORMAL (ref 1–2.5)
ALP BLD-CCNC: 90 U/L (ref 40–129)
ALT SERPL-CCNC: 12 U/L (ref 5–41)
ANION GAP SERPL CALCULATED.3IONS-SCNC: 13 MMOL/L (ref 9–17)
AST SERPL-CCNC: 14 U/L
BASOPHILS # BLD: 0 % (ref 0–2)
BASOPHILS ABSOLUTE: 0.05 K/UL (ref 0–0.2)
BILIRUB SERPL-MCNC: 0.12 MG/DL (ref 0.3–1.2)
BUN BLDV-MCNC: 8 MG/DL (ref 6–20)
BUN/CREAT BLD: 9 (ref 9–20)
CALCIUM SERPL-MCNC: 8.9 MG/DL (ref 8.6–10.4)
CHLORIDE BLD-SCNC: 100 MMOL/L (ref 98–107)
CO2: 24 MMOL/L (ref 20–31)
CREAT SERPL-MCNC: 0.89 MG/DL (ref 0.7–1.2)
DIFFERENTIAL TYPE: ABNORMAL
EOSINOPHILS RELATIVE PERCENT: 2 % (ref 1–4)
GFR AFRICAN AMERICAN: >60 ML/MIN
GFR NON-AFRICAN AMERICAN: >60 ML/MIN
GFR SERPL CREATININE-BSD FRML MDRD: ABNORMAL ML/MIN/{1.73_M2}
GFR SERPL CREATININE-BSD FRML MDRD: ABNORMAL ML/MIN/{1.73_M2}
GLUCOSE BLD-MCNC: 103 MG/DL (ref 70–99)
HCT VFR BLD CALC: 42.6 % (ref 40.7–50.3)
HEMOGLOBIN: 13.4 G/DL (ref 13–17)
IMMATURE GRANULOCYTES: 1 %
LIPASE: 15 U/L (ref 13–60)
LYMPHOCYTES # BLD: 15 % (ref 24–43)
MCH RBC QN AUTO: 28.6 PG (ref 25.2–33.5)
MCHC RBC AUTO-ENTMCNC: 31.5 G/DL (ref 28.4–34.8)
MCV RBC AUTO: 90.8 FL (ref 82.6–102.9)
MONOCYTES # BLD: 7 % (ref 3–12)
NRBC AUTOMATED: 0 PER 100 WBC
PDW BLD-RTO: 13.1 % (ref 11.8–14.4)
PLATELET # BLD: 295 K/UL (ref 138–453)
PLATELET ESTIMATE: ABNORMAL
PMV BLD AUTO: 10.1 FL (ref 8.1–13.5)
POTASSIUM SERPL-SCNC: 3.9 MMOL/L (ref 3.7–5.3)
RBC # BLD: 4.69 M/UL (ref 4.21–5.77)
RBC # BLD: ABNORMAL 10*6/UL
SEG NEUTROPHILS: 75 % (ref 36–65)
SEGMENTED NEUTROPHILS ABSOLUTE COUNT: 9.68 K/UL (ref 1.5–8.1)
SODIUM BLD-SCNC: 137 MMOL/L (ref 135–144)
TOTAL PROTEIN: 7.5 G/DL (ref 6.4–8.3)
WBC # BLD: 12.9 K/UL (ref 3.5–11.3)
WBC # BLD: ABNORMAL 10*3/UL

## 2021-10-26 PROCEDURE — 74018 RADEX ABDOMEN 1 VIEW: CPT

## 2021-10-26 PROCEDURE — 96374 THER/PROPH/DIAG INJ IV PUSH: CPT

## 2021-10-26 PROCEDURE — 6360000002 HC RX W HCPCS: Performed by: EMERGENCY MEDICINE

## 2021-10-26 PROCEDURE — 83690 ASSAY OF LIPASE: CPT

## 2021-10-26 PROCEDURE — 2500000003 HC RX 250 WO HCPCS: Performed by: EMERGENCY MEDICINE

## 2021-10-26 PROCEDURE — 99282 EMERGENCY DEPT VISIT SF MDM: CPT

## 2021-10-26 PROCEDURE — 96375 TX/PRO/DX INJ NEW DRUG ADDON: CPT

## 2021-10-26 PROCEDURE — 85025 COMPLETE CBC W/AUTO DIFF WBC: CPT

## 2021-10-26 PROCEDURE — 2580000003 HC RX 258: Performed by: EMERGENCY MEDICINE

## 2021-10-26 PROCEDURE — 80053 COMPREHEN METABOLIC PANEL: CPT

## 2021-10-26 RX ORDER — 0.9 % SODIUM CHLORIDE 0.9 %
1000 INTRAVENOUS SOLUTION INTRAVENOUS ONCE
Status: COMPLETED | OUTPATIENT
Start: 2021-10-26 | End: 2021-10-26

## 2021-10-26 RX ORDER — ONDANSETRON 2 MG/ML
4 INJECTION INTRAMUSCULAR; INTRAVENOUS ONCE
Status: COMPLETED | OUTPATIENT
Start: 2021-10-26 | End: 2021-10-26

## 2021-10-26 RX ORDER — MORPHINE SULFATE 4 MG/ML
4 INJECTION, SOLUTION INTRAMUSCULAR; INTRAVENOUS ONCE
Status: COMPLETED | OUTPATIENT
Start: 2021-10-26 | End: 2021-10-26

## 2021-10-26 RX ADMIN — MORPHINE SULFATE 4 MG: 4 INJECTION INTRAVENOUS at 22:32

## 2021-10-26 RX ADMIN — ONDANSETRON 4 MG: 2 INJECTION INTRAMUSCULAR; INTRAVENOUS at 22:32

## 2021-10-26 RX ADMIN — SODIUM CHLORIDE 1000 ML: 9 INJECTION, SOLUTION INTRAVENOUS at 22:32

## 2021-10-26 RX ADMIN — FAMOTIDINE 20 MG: 10 INJECTION, SOLUTION INTRAVENOUS at 22:32

## 2021-10-26 ASSESSMENT — ENCOUNTER SYMPTOMS
VOMITING: 0
BACK PAIN: 0
BLOOD IN STOOL: 0
ABDOMINAL PAIN: 1
DIARRHEA: 1
SHORTNESS OF BREATH: 0
NAUSEA: 1

## 2021-10-26 ASSESSMENT — PAIN SCALES - GENERAL
PAINLEVEL_OUTOF10: 8
PAINLEVEL_OUTOF10: 8

## 2021-10-26 ASSESSMENT — PAIN DESCRIPTION - FREQUENCY: FREQUENCY: CONTINUOUS

## 2021-10-26 ASSESSMENT — PAIN DESCRIPTION - ORIENTATION: ORIENTATION: LOWER;MID

## 2021-10-26 ASSESSMENT — PAIN DESCRIPTION - LOCATION: LOCATION: ABDOMEN

## 2021-10-26 ASSESSMENT — PAIN DESCRIPTION - PAIN TYPE: TYPE: ACUTE PAIN

## 2021-10-26 NOTE — Clinical Note
Aleksander Sen was seen and treated in our emergency department on 10/26/2021. He may return to work on 10/29/2021. If you have any questions or concerns, please don't hesitate to call.       Suri Smith 1721, DO

## 2021-10-27 PROCEDURE — 6370000000 HC RX 637 (ALT 250 FOR IP): Performed by: EMERGENCY MEDICINE

## 2021-10-27 RX ORDER — OXYCODONE HYDROCHLORIDE AND ACETAMINOPHEN 5; 325 MG/1; MG/1
1 TABLET ORAL ONCE
Status: COMPLETED | OUTPATIENT
Start: 2021-10-27 | End: 2021-10-27

## 2021-10-27 RX ADMIN — OXYCODONE AND ACETAMINOPHEN 1 TABLET: 5; 325 TABLET ORAL at 01:02

## 2021-10-27 ASSESSMENT — PAIN SCALES - GENERAL: PAINLEVEL_OUTOF10: 8

## 2021-10-27 NOTE — ED PROVIDER NOTES
[facility]  Emergency Department Encounter     Pt Name: Janee Hong  MRN: [id number]  [place] 1980  Date of evaluation: 10/26/21  PCP:  No primary care provider on file. CHIEF COMPLAINT       Chief Complaint   Patient presents with    Abdominal Pain     was here Thurs for same issue; states lower middle pain    Diarrhea     states severe x yesterday       HISTORY OF PRESENT ILLNESS  (Location/Symptom, Timing/Onset, Context/Setting, Quality, Duration, Modifying Factors, Severity.)    Janee Hong is a 39 y.o. male who presents with persistent left lower quadrant abdominal pain, nausea but no vomiting, development of profuse frequent watery stools since yesterday. Patient was seen emergency department not too long ago and diagnosed with diverticulitis and sent home on Augmentin. Patient states that he has been able to tolerate his antibiotics. He states however couple weeks ago he had similar symptoms and was discharged home on Cipro and Flagyl at that time. Does have a history of recurrent issues with diverticulitis. States that he is currently in between getting set up with physicians and does not routinely follow with a GI doctor. Has never had a colonoscopy. No diagnosis of ulcerative colitis or Crohn's that he is aware of. Also mentions history of urinary frequency, however when he goes to urinate not a lot comes out. No blood in his urine or burning with urination. No back pain. No prior abdominal surgeries. PAST MEDICAL / SURGICAL / SOCIAL / FAMILY HISTORY    has a past medical history of Back pain, Diverticulitis, Eczema, HTN (hypertension), and Psoriasis. has no past surgical history on file.     Social History     Socioeconomic History    Marital status: Single     Spouse name: Not on file    Number of children: Not on file    Years of education: Not on file    Highest education level: Not on file   Occupational History    Not on every 6 hours as needed for Pain for up to 5 days. Intended supply: 3 days. Take lowest dose possible to manage pain 10/22/21 10/27/21  Juvencio Mcneill,    ondansetron (ZOFRAN ODT) 4 MG disintegrating tablet Take 1 tablet by mouth every 8 hours as needed for Nausea 10/22/21   Juvencio Mcneill, DO   amoxicillin-clavulanate (AUGMENTIN) 875-125 MG per tablet Take 1 tablet by mouth 2 times daily for 10 days 10/22/21 11/1/21  Juvencio Mcneill, DO   amLODIPine (NORVASC) 5 MG tablet Take 1 tablet by mouth daily 9/6/21   Noreene Joaquin, APRN - CNP   triamterene-hydroCHLOROthiazide (MAXZIDE) 75-50 MG per tablet Take 1 tablet by mouth daily 4/19/21   Chan Patel MD   ibuprofen (ADVIL;MOTRIN) 600 MG tablet Take 1 tablet by mouth 4 times daily as needed for Pain  Patient not taking: Reported on 11/23/2020 2/3/20 3/1/21  Mir Wong MD       REVIEW OF SYSTEMS    (2-9 systems for level 4, 10 or more for level 5)    Review of Systems   Constitutional: Negative for chills, diaphoresis and fever. Respiratory: Negative for shortness of breath. Cardiovascular: Negative for chest pain. Gastrointestinal: Positive for abdominal pain, diarrhea and nausea. Negative for blood in stool and vomiting. Endocrine: Negative for polyuria. Genitourinary: Positive for decreased urine volume and frequency. Negative for difficulty urinating, dysuria, flank pain, hematuria and urgency. Musculoskeletal: Negative for back pain. Neurological: Negative for dizziness, syncope, weakness and light-headedness. PHYSICAL EXAM   (up to 7 for level 4, 8 or more for level 5)    VITALS:   Vitals:    10/26/21 2057   BP: (!) 161/113   Pulse: 94   Resp: 18   Temp: 98.8 °F (37.1 °C)   TempSrc: Oral   SpO2: 100%   Weight: 220 lb (99.8 kg)   Height: 5' 9\" (1.753 m)       Physical Exam  Vitals and nursing note reviewed. Constitutional:       General: He is not in acute distress. Appearance: He is well-developed. He is not diaphoretic. HENT:      Head: Normocephalic and atraumatic. Mouth/Throat:      Mouth: Mucous membranes are moist.   Eyes:      Conjunctiva/sclera: Conjunctivae normal.   Cardiovascular:      Rate and Rhythm: Normal rate and regular rhythm. Heart sounds: Normal heart sounds. Pulmonary:      Effort: Pulmonary effort is normal. No respiratory distress. Breath sounds: Normal breath sounds. No wheezing, rhonchi or rales. Abdominal:      General: Abdomen is flat. There is no distension. Palpations: Abdomen is soft. Tenderness: There is abdominal tenderness in the left lower quadrant. There is no guarding or rebound. Musculoskeletal:         General: Normal range of motion. Cervical back: Normal range of motion. Skin:     General: Skin is warm and dry. Neurological:      General: No focal deficit present. Mental Status: He is alert.    Psychiatric:         Behavior: Behavior normal.         DIFFERENTIAL  DIAGNOSIS   PLAN (LABS / IMAGING / EKG):  Orders Placed This Encounter   Procedures    C DIFF TOXIN/ANTIGEN    XR ABDOMEN (KUB) (SINGLE AP VIEW)    CBC with DIFF    Comprehensive Metabolic Panel w/ Reflex to MG    Lipase    Urinalysis with Microscopic    Insert peripheral IV       MEDICATIONS ORDERED:  Orders Placed This Encounter   Medications    0.9 % sodium chloride bolus    ondansetron (ZOFRAN) injection 4 mg    famotidine (PEPCID) injection 20 mg    morphine sulfate (PF) injection 4 mg    oxyCODONE-acetaminophen (PERCOCET) 5-325 MG per tablet 1 tablet     DIAGNOSTIC RESULTS / EMERGENCYDEPARTMENT COURSE / MDM   LABS:  Labs Reviewed   CBC WITH AUTO DIFFERENTIAL - Abnormal; Notable for the following components:       Result Value    WBC 12.9 (*)     Seg Neutrophils 75 (*)     Lymphocytes 15 (*)     Immature Granulocytes 1 (*)     Segs Absolute 9.68 (*)     All other components within normal limits   COMPREHENSIVE METABOLIC PANEL W/ REFLEX TO MG FOR LOW K - Abnormal; Notable program.  Efforts were made to edit the dictations but occasionally words are mis-transcribed.)        Suri Smith 1721, DO  10/27/21 0106

## 2022-01-01 ENCOUNTER — HOSPITAL ENCOUNTER (EMERGENCY)
Age: 42
Discharge: HOME | End: 2022-01-02
Attending: EMERGENCY MEDICINE
Payer: COMMERCIAL

## 2022-01-01 DIAGNOSIS — R51.9 ACUTE NONINTRACTABLE HEADACHE, UNSPECIFIED HEADACHE TYPE: Primary | ICD-10-CM

## 2022-01-01 LAB
ABSOLUTE EOS #: 0.03 K/UL (ref 0–0.44)
ABSOLUTE IMMATURE GRANULOCYTE: 0.03 K/UL (ref 0–0.3)
ABSOLUTE LYMPH #: 1.59 K/UL (ref 1.1–3.7)
ABSOLUTE MONO #: 0.62 K/UL (ref 0.1–1.2)
ANION GAP SERPL CALCULATED.3IONS-SCNC: 13 MMOL/L (ref 9–17)
BASOPHILS # BLD: 0 % (ref 0–2)
BASOPHILS ABSOLUTE: <0.03 K/UL (ref 0–0.2)
BUN BLDV-MCNC: 12 MG/DL (ref 6–20)
BUN/CREAT BLD: 13 (ref 9–20)
CALCIUM SERPL-MCNC: 8.9 MG/DL (ref 8.6–10.4)
CHLORIDE BLD-SCNC: 104 MMOL/L (ref 98–107)
CO2: 21 MMOL/L (ref 20–31)
CREAT SERPL-MCNC: 0.93 MG/DL (ref 0.7–1.2)
DIFFERENTIAL TYPE: ABNORMAL
EOSINOPHILS RELATIVE PERCENT: 0 % (ref 1–4)
GFR AFRICAN AMERICAN: >60 ML/MIN
GFR NON-AFRICAN AMERICAN: >60 ML/MIN
GFR SERPL CREATININE-BSD FRML MDRD: ABNORMAL ML/MIN/{1.73_M2}
GFR SERPL CREATININE-BSD FRML MDRD: ABNORMAL ML/MIN/{1.73_M2}
GLUCOSE BLD-MCNC: 95 MG/DL (ref 70–99)
HCT VFR BLD CALC: 42 % (ref 40.7–50.3)
HEMOGLOBIN: 13.5 G/DL (ref 13–17)
IMMATURE GRANULOCYTES: 0 %
LYMPHOCYTES # BLD: 20 % (ref 24–43)
MCH RBC QN AUTO: 28.7 PG (ref 25.2–33.5)
MCHC RBC AUTO-ENTMCNC: 32.1 G/DL (ref 28.4–34.8)
MCV RBC AUTO: 89.4 FL (ref 82.6–102.9)
MONOCYTES # BLD: 8 % (ref 3–12)
NRBC AUTOMATED: 0 PER 100 WBC
PDW BLD-RTO: 13.6 % (ref 11.8–14.4)
PLATELET # BLD: 220 K/UL (ref 138–453)
PLATELET ESTIMATE: ABNORMAL
PMV BLD AUTO: 10.3 FL (ref 8.1–13.5)
POTASSIUM SERPL-SCNC: 3.3 MMOL/L (ref 3.7–5.3)
RBC # BLD: 4.7 M/UL (ref 4.21–5.77)
RBC # BLD: ABNORMAL 10*6/UL
SEG NEUTROPHILS: 72 % (ref 36–65)
SEGMENTED NEUTROPHILS ABSOLUTE COUNT: 5.79 K/UL (ref 1.5–8.1)
SODIUM BLD-SCNC: 138 MMOL/L (ref 135–144)
WBC # BLD: 8.1 K/UL (ref 3.5–11.3)
WBC # BLD: ABNORMAL 10*3/UL

## 2022-01-01 PROCEDURE — 96372 THER/PROPH/DIAG INJ SC/IM: CPT

## 2022-01-01 PROCEDURE — 36415 COLL VENOUS BLD VENIPUNCTURE: CPT

## 2022-01-01 PROCEDURE — 99283 EMERGENCY DEPT VISIT LOW MDM: CPT

## 2022-01-01 PROCEDURE — 80048 BASIC METABOLIC PNL TOTAL CA: CPT

## 2022-01-01 PROCEDURE — 6360000002 HC RX W HCPCS: Performed by: EMERGENCY MEDICINE

## 2022-01-01 PROCEDURE — 85025 COMPLETE CBC W/AUTO DIFF WBC: CPT

## 2022-01-01 RX ORDER — KETOROLAC TROMETHAMINE 30 MG/ML
60 INJECTION, SOLUTION INTRAMUSCULAR; INTRAVENOUS ONCE
Status: COMPLETED | OUTPATIENT
Start: 2022-01-01 | End: 2022-01-01

## 2022-01-01 RX ORDER — IBUPROFEN 200 MG
200 TABLET ORAL EVERY 6 HOURS PRN
COMMUNITY

## 2022-01-01 RX ADMIN — KETOROLAC TROMETHAMINE 60 MG: 30 INJECTION, SOLUTION INTRAMUSCULAR at 23:08

## 2022-01-01 ASSESSMENT — ENCOUNTER SYMPTOMS
ABDOMINAL PAIN: 0
VOMITING: 0
EYE DISCHARGE: 0
EYE REDNESS: 0
COUGH: 0
FACIAL SWELLING: 0
COLOR CHANGE: 0
DIARRHEA: 0
CONSTIPATION: 0
SHORTNESS OF BREATH: 0

## 2022-01-01 ASSESSMENT — PAIN DESCRIPTION - DESCRIPTORS: DESCRIPTORS: ACHING;HEADACHE;THROBBING

## 2022-01-01 ASSESSMENT — PAIN SCALES - GENERAL
PAINLEVEL_OUTOF10: 8
PAINLEVEL_OUTOF10: 8

## 2022-01-01 ASSESSMENT — PAIN DESCRIPTION - FREQUENCY: FREQUENCY: CONTINUOUS

## 2022-01-01 ASSESSMENT — PAIN DESCRIPTION - PAIN TYPE: TYPE: ACUTE PAIN

## 2022-01-01 ASSESSMENT — PAIN DESCRIPTION - LOCATION: LOCATION: HEAD

## 2022-01-02 VITALS
DIASTOLIC BLOOD PRESSURE: 89 MMHG | HEIGHT: 69 IN | SYSTOLIC BLOOD PRESSURE: 136 MMHG | WEIGHT: 220 LBS | TEMPERATURE: 99 F | OXYGEN SATURATION: 95 % | RESPIRATION RATE: 16 BRPM | BODY MASS INDEX: 32.58 KG/M2 | HEART RATE: 93 BPM

## 2022-01-02 RX ORDER — BUTALBITAL, ACETAMINOPHEN AND CAFFEINE 300; 40; 50 MG/1; MG/1; MG/1
1 CAPSULE ORAL EVERY 6 HOURS PRN
Qty: 20 CAPSULE | Refills: 0 | Status: SHIPPED | OUTPATIENT
Start: 2022-01-02

## 2022-01-02 NOTE — ED PROVIDER NOTES
[address] [place] [address] ED  EMERGENCY DEPARTMENT ENCOUNTER      Pt Name: Arelis Mcmahon  MRN: [id number]  [place] 1980  Date of evaluation: 2022  Provider: Michaela Warren MD    CHIEF COMPLAINT       Chief Complaint   Patient presents with    Headache    Hypertension    Fever         HISTORY OF PRESENT ILLNESS  (Location/Symptom, Timing/Onset, Context/Setting, Quality, Duration, Modifying Factors, Severity.)   Arelis Mcmahon is a 39 y.o. male who presents to the emergency department for headache. He has had this for a few days and is mostly frontal.  He states paramedics checked his blood pressure and it was high. He has a history of hypertension and has been taking all of his blood pressure medicine. No trauma or stiff neck. He has no history of documented fever. He has been taking ibuprofen for his headache. He rated it as an 8. Nursing Notes were reviewed. ALLERGIES     Patient has no known allergies. CURRENT MEDICATIONS       Previous Medications    ACETAMINOPHEN (APAP EXTRA STRENGTH) 500 MG TABLET    Take 2 tablets by mouth every 6 hours as needed for Pain    AMLODIPINE (NORVASC) 5 MG TABLET    Take 1 tablet by mouth daily    IBUPROFEN (ADVIL;MOTRIN) 200 MG TABLET    Take 200 mg by mouth every 6 hours as needed for Pain    ONDANSETRON (ZOFRAN ODT) 4 MG DISINTEGRATING TABLET    Take 1 tablet by mouth every 8 hours as needed for Nausea       PAST MEDICAL HISTORY         Diagnosis Date    Back pain     on 19 pt denies pain mgmnt    Diverticulitis     Eczema     HTN (hypertension)     Psoriasis        SURGICAL HISTORY     History reviewed. No pertinent surgical history. FAMILY HISTORY           Problem Relation Age of Onset    Cancer Mother         colon cancer-  56y/o    Diabetes Maternal Grandmother      Family Status   Relation Name Status    Mother  (Not Specified)    MGM  (Not Specified)        SOCIAL HISTORY      reports that he has quit smoking.  His smoking use included cigars and cigarettes. He has a 10.00 pack-year smoking history. He has never used smokeless tobacco. He reports current alcohol use. He reports that he does not use drugs. REVIEW OF SYSTEMS    (2-9 systems for level 4, 10 or more for level 5)     Review of Systems   Constitutional: Negative for chills, fatigue and fever. HENT: Negative for congestion, ear discharge and facial swelling. Eyes: Negative for discharge and redness. Respiratory: Negative for cough and shortness of breath. Cardiovascular: Negative for chest pain. Gastrointestinal: Negative for abdominal pain, constipation, diarrhea and vomiting. Genitourinary: Negative for dysuria and hematuria. Musculoskeletal: Negative for arthralgias. Skin: Negative for color change and rash. Neurological: Positive for headaches. Negative for syncope and numbness. Hematological: Negative for adenopathy. Psychiatric/Behavioral: Negative for confusion. The patient is not nervous/anxious. Except as noted above the remainder of the review of systems was reviewed and negative. PHYSICAL EXAM    (up to 7 for level 4, 8 or more for level 5)     Vitals:    01/01/22 2315 01/01/22 2330 01/01/22 2345 01/02/22 0000   BP: (!) 144/94 (!) 141/94 (!) 138/95 (!) 134/92   Pulse:       Resp:       Temp:       TempSrc:       SpO2:       Weight:       Height:           Physical Exam  Vitals reviewed. Constitutional:       General: He is not in acute distress. Appearance: He is well-developed. He is not diaphoretic. HENT:      Head: Normocephalic and atraumatic. Comments: Neck supple, no nuchal rigidity. Eyes:      General: No scleral icterus. Right eye: No discharge. Left eye: No discharge. Cardiovascular:      Rate and Rhythm: Normal rate and regular rhythm. Pulmonary:      Effort: Pulmonary effort is normal. No respiratory distress. Breath sounds: Normal breath sounds. No stridor. No wheezing or rales. Abdominal:      General: There is no distension. Palpations: Abdomen is soft. Tenderness: There is no abdominal tenderness. Musculoskeletal:         General: Normal range of motion. Cervical back: Neck supple. Lymphadenopathy:      Cervical: No cervical adenopathy. Skin:     General: Skin is warm and dry. Findings: No erythema or rash. Neurological:      Mental Status: He is alert and oriented to person, place, and time. Psychiatric:         Behavior: Behavior normal.             DIAGNOSTIC RESULTS     EKG: All EKG's are interpreted by the Emergency Department Physician who either signs or Co-signs this chart in the absence of a cardiologist.      RADIOLOGY:   Non-plain film images such as CT, Ultrasound and MRI are read by the radiologist. Plain radiographic images are visualized and preliminarily interpreted by the emergency physician with the below findings:    Interpretation per the Radiologist below, if available at the time of this note:        LABS:  Labs Reviewed   CBC WITH AUTO DIFFERENTIAL - Abnormal; Notable for the following components:       Result Value    Seg Neutrophils 72 (*)     Lymphocytes 20 (*)     Eosinophils % 0 (*)     All other components within normal limits   BASIC METABOLIC PANEL - Abnormal; Notable for the following components:    Potassium 3.3 (*)     All other components within normal limits       All other labs were within normal range or not returned as of this dictation.     EMERGENCY DEPARTMENT COURSE and DIFFERENTIAL DIAGNOSIS/MDM:   Vitals:    Vitals:    01/01/22 2315 01/01/22 2330 01/01/22 2345 01/02/22 0000   BP: (!) 144/94 (!) 141/94 (!) 138/95 (!) 134/92   Pulse:       Resp:       Temp:       TempSrc:       SpO2:       Weight:       Height:           Orders Placed This Encounter   Medications    ketorolac (TORADOL) injection 60 mg    butalbital-APAP-caffeine (FIORICET) -40 MG CAPS per capsule     Sig: Take 1 capsule by mouth every 6 hours as needed for Headaches     Dispense:  20 capsule     Refill:  0       Medical Decision Making: He was given IM Toradol and his head feels a lot better. His blood pressure is not significantly elevated and he is able to be discharged home on Fioricet. I have no clinical suspicion of acute intracranial process or meningitis. Treatment diagnosis and follow-up were discussed with the patient. CONSULTS:  None    PROCEDURES:  None    FINAL IMPRESSION      1. Acute nonintractable headache, unspecified headache type          DISPOSITION/PLAN   DISPOSITION Decision To Discharge 01/02/2022 12:25:21 AM      PATIENT REFERRED TO:   [facility] ED  [address] [facility]  [phone]    If symptoms worsen      DISCHARGE MEDICATIONS:     New Prescriptions    BUTALBITAL-APAP-CAFFEINE (FIORICET) -40 MG CAPS PER CAPSULE    Take 1 capsule by mouth every 6 hours as needed for Headaches       The care is provided during an unprecedented national emergency due to the novel coronavirus, COVID-19.     (Please note that portions of this note were completed with a voice recognition program.  Efforts were made to edit the dictations but occasionally words are mis-transcribed.)    Marifer Flores MD  Attending Emergency Physician           Marifer Flores MD  01/02/22 3857

## 2022-01-24 ENCOUNTER — HOSPITAL ENCOUNTER (EMERGENCY)
Age: 42
Discharge: HOME | End: 2022-01-24
Attending: EMERGENCY MEDICINE
Payer: COMMERCIAL

## 2022-01-24 VITALS
SYSTOLIC BLOOD PRESSURE: 160 MMHG | BODY MASS INDEX: 31.84 KG/M2 | TEMPERATURE: 97.6 F | HEART RATE: 94 BPM | OXYGEN SATURATION: 99 % | HEIGHT: 69 IN | DIASTOLIC BLOOD PRESSURE: 103 MMHG | RESPIRATION RATE: 18 BRPM | WEIGHT: 215 LBS

## 2022-01-24 DIAGNOSIS — S29.012A UPPER BACK STRAIN, INITIAL ENCOUNTER: Primary | ICD-10-CM

## 2022-01-24 PROCEDURE — 99283 EMERGENCY DEPT VISIT LOW MDM: CPT

## 2022-01-24 PROCEDURE — 96372 THER/PROPH/DIAG INJ SC/IM: CPT

## 2022-01-24 PROCEDURE — 6370000000 HC RX 637 (ALT 250 FOR IP): Performed by: PHYSICIAN ASSISTANT

## 2022-01-24 PROCEDURE — 6360000002 HC RX W HCPCS: Performed by: PHYSICIAN ASSISTANT

## 2022-01-24 RX ORDER — METAXALONE 800 MG/1
800 TABLET ORAL ONCE
Status: COMPLETED | OUTPATIENT
Start: 2022-01-24 | End: 2022-01-24

## 2022-01-24 RX ORDER — KETOROLAC TROMETHAMINE 30 MG/ML
60 INJECTION, SOLUTION INTRAMUSCULAR; INTRAVENOUS ONCE
Status: COMPLETED | OUTPATIENT
Start: 2022-01-24 | End: 2022-01-24

## 2022-01-24 RX ORDER — PREDNISONE 20 MG/1
20 TABLET ORAL 2 TIMES DAILY
Qty: 10 TABLET | Refills: 0 | Status: SHIPPED | OUTPATIENT
Start: 2022-01-24 | End: 2022-01-29

## 2022-01-24 RX ORDER — METHOCARBAMOL 750 MG/1
750 TABLET, FILM COATED ORAL 4 TIMES DAILY
Qty: 40 TABLET | Refills: 0 | Status: SHIPPED | OUTPATIENT
Start: 2022-01-24 | End: 2022-02-03

## 2022-01-24 RX ORDER — TRIAMCINOLONE ACETONIDE 1 MG/G
CREAM TOPICAL
Qty: 80 G | Refills: 0 | Status: SHIPPED | OUTPATIENT
Start: 2022-01-24

## 2022-01-24 RX ADMIN — KETOROLAC TROMETHAMINE 60 MG: 30 INJECTION, SOLUTION INTRAMUSCULAR at 17:58

## 2022-01-24 RX ADMIN — METAXALONE 800 MG: 800 TABLET ORAL at 18:05

## 2022-01-24 ASSESSMENT — PAIN SCALES - GENERAL
PAINLEVEL_OUTOF10: 8
PAINLEVEL_OUTOF10: 8

## 2022-01-25 NOTE — ED PROVIDER NOTES
[address] [place] [address] ED  eMERGENCY dEPARTMENTeNCOUnter      Pt Name: Yessenia Alcantar  MRN: [id number]  [place] 1980  Date ofevaluation: 1/24/2022  Provider: Meena Caicedo PA-C    CHIEF COMPLAINT       Chief Complaint   Patient presents with    Back Pain     Chronic back pain; reports symptoms started yesterday         HISTORY OF PRESENT ILLNESS  (Location/Symptom, Timing/Onset, Context/Setting, Quality, Duration, Modifying Factors, Severity.)   Yessenia Alcantar is a 39 y.o. male who presents to the emergency department with flareup of his chronic back pain. Reports history of disc problems in his neck and back. Says that it is flared up on him again. Does not want any imaging x-rays. Requesting steroids and a pain shot that she has gotten before. It chiefly has helped him. Symptoms present for last couple days. No injuries. No chest pain shortness of breath. No paresthesias at this time. Nursing Notes were reviewed. ALLERGIES     Patient has no known allergies.     CURRENT MEDICATIONS       Discharge Medication List as of 1/24/2022  6:04 PM      CONTINUE these medications which have NOT CHANGED    Details   butalbital-APAP-caffeine (FIORICET) -40 MG CAPS per capsule Take 1 capsule by mouth every 6 hours as needed for Headaches, Disp-20 capsule, R-0Normal      ibuprofen (ADVIL;MOTRIN) 200 MG tablet Take 200 mg by mouth every 6 hours as needed for PainHistorical Med      acetaminophen (APAP EXTRA STRENGTH) 500 MG tablet Take 2 tablets by mouth every 6 hours as needed for Pain, Disp-30 tablet, R-0Print      ondansetron (ZOFRAN ODT) 4 MG disintegrating tablet Take 1 tablet by mouth every 8 hours as needed for Nausea, Disp-12 tablet, R-0Print      amLODIPine (NORVASC) 5 MG tablet Take 1 tablet by mouth daily, Disp-30 tablet, R-0Print             PAST MEDICAL HISTORY         Diagnosis Date    Back pain     on 12/24/19 pt denies pain mgmnt    Diverticulitis     Eczema     HTN (hypertension)     Psoriasis        SURGICAL HISTORY     History reviewed. No pertinent surgical history. FAMILY HISTORY           Problem Relation Age of Onset    Cancer Mother         colon cancer-  54y/o    Diabetes Maternal Grandmother      Family Status   Relation Name Status    Mother  (Not Specified)    MGM  (Not Specified)        SOCIAL HISTORY      reports that he has quit smoking. His smoking use included cigars and cigarettes. He has a 10.00 pack-year smoking history. He has never used smokeless tobacco. He reports current alcohol use. He reports that he does not use drugs. REVIEW OFSYSTEMS    (2-9 systems for level 4, 10 or more for level 5)   Review of Systems    Except as noted above the remainder of the review of systems was reviewed and negative. PHYSICAL EXAM    (up to 7 for level 4, 8 or more for level 5)     ED Triage Vitals [22 1719]   BP Temp Temp Source Pulse Resp SpO2 Height Weight   (!) 160/103 97.6 °F (36.4 °C) Oral 94 18 99 % 5' 9\" (1.753 m) 215 lb (97.5 kg)      Physical Exam  Constitutional:       Appearance: He is well-developed. HENT:      Head: Normocephalic and atraumatic. Neck:     Cardiovascular:      Rate and Rhythm: Normal rate and regular rhythm. Pulmonary:      Effort: Pulmonary effort is normal.      Breath sounds: Normal breath sounds. Abdominal:      Palpations: Abdomen is soft. Musculoskeletal:         General: Normal range of motion. Cervical back: Normal range of motion and neck supple. Skin:     General: Skin is warm. Neurological:      Mental Status: He is alert and oriented to person, place, and time.    Psychiatric:         Behavior: Behavior normal.                 DIAGNOSTIC RESULTS     EKG: All EKG's are interpreted by the Emergency Department Physician who either signs or Co-signs this chart in the absence of a cardiologist.        RADIOLOGY:   Non-plain film images such as CT, Ultrasound and MRI are read by the radiologist. Tish Cranker radiographic images arevisualized and preliminarily interpreted by the emergency physician with the below findings:        Interpretation per the Radiologist below, if available at thetime of this note:          ED BEDSIDE ULTRASOUND:   Performed by ED Physician - none    LABS:  Labs Reviewed - No data to display    All other labs were within normal range or not returned as of this dictation. EMERGENCY DEPARTMENT COURSE and DIFFERENTIAL DIAGNOSIS/MDM:   Vitals:    Vitals:    01/24/22 1719   BP: (!) 160/103   Pulse: 94   Resp: 18   Temp: 97.6 °F (36.4 °C)   TempSrc: Oral   SpO2: 99%   Weight: 215 lb (97.5 kg)   Height: 5' 9\" (1.753 m)     Work-up discussed and declined. States symptoms feel typical compared to previous flareups of his chronic neck and back pain. Patient be treated symptomatically and discharged home. Also given medication for eczema. Patient has had eczema flareup on his neck as well. CONSULTS:  None    PROCEDURES:  Procedures        FINAL IMPRESSION      1.  Upper back strain, initial encounter          DISPOSITION/PLAN   DISPOSITION Decision To Discharge 01/24/2022 06:02:29 PM      PATIENTREFERRED TO:   [address] [facility] [address]  [address] [facility] [address]  [phone]    In 3 days        DISCHARGE MEDICATIONS:     Discharge Medication List as of 1/24/2022  6:04 PM      START taking these medications    Details   predniSONE (DELTASONE) 20 MG tablet Take 1 tablet by mouth 2 times daily for 5 days, Disp-10 tablet, R-0Normal      methocarbamol (ROBAXIN-750) 750 MG tablet Take 1 tablet by mouth 4 times daily for 10 days, Disp-40 tablet, R-0Normal      triamcinolone (KENALOG) 0.1 % cream Apply topically 2 times daily for 1 week., Disp-80 g, R-0, Normal                 (Please note that portions of this note were completed with a voice recognition program.  Efforts were made to edit thedictations but occasionally words are mis-transcribed.)    Radha Anaya Hannah Plasencia PA-C  01/24/22 5723

## 2022-01-27 NOTE — ED PROVIDER NOTES
eMERGENCY dEPARTMENT eNCOUnter   [address] Name: Ella Tang  MRN: [id number]  [place] 1980  Date of evaluation: 1/27/22     Ella Tang is a 39 y.o. male with CC: Back Pain (Chronic back pain; reports symptoms started yesterday)        This visit was performed by both a physician and an APC. I performed all aspects of the MDM as documented. The care is provided during an unprecedented national emergency due to the novel coronavirus, COVID 19.     Maynor Coyle MD  Attending Emergency Physician            Maynor Coyle MD  01/27/22 0774

## 2022-05-30 ENCOUNTER — HOSPITAL ENCOUNTER (EMERGENCY)
Age: 42
Discharge: HOME | End: 2022-05-30
Attending: EMERGENCY MEDICINE
Payer: OTHER GOVERNMENT

## 2022-05-30 VITALS
OXYGEN SATURATION: 98 % | DIASTOLIC BLOOD PRESSURE: 95 MMHG | RESPIRATION RATE: 18 BRPM | WEIGHT: 220 LBS | HEIGHT: 69 IN | TEMPERATURE: 99.7 F | SYSTOLIC BLOOD PRESSURE: 149 MMHG | HEART RATE: 91 BPM | BODY MASS INDEX: 32.58 KG/M2

## 2022-05-30 DIAGNOSIS — M54.50 BILATERAL LOW BACK PAIN WITHOUT SCIATICA, UNSPECIFIED CHRONICITY: ICD-10-CM

## 2022-05-30 DIAGNOSIS — L30.9 ECZEMA, UNSPECIFIED TYPE: Primary | ICD-10-CM

## 2022-05-30 PROCEDURE — 99284 EMERGENCY DEPT VISIT MOD MDM: CPT

## 2022-05-30 PROCEDURE — 96372 THER/PROPH/DIAG INJ SC/IM: CPT

## 2022-05-30 PROCEDURE — 6360000002 HC RX W HCPCS: Performed by: EMERGENCY MEDICINE

## 2022-05-30 RX ORDER — METHYLPREDNISOLONE SODIUM SUCCINATE 125 MG/2ML
125 INJECTION, POWDER, LYOPHILIZED, FOR SOLUTION INTRAMUSCULAR; INTRAVENOUS ONCE
Status: COMPLETED | OUTPATIENT
Start: 2022-05-30 | End: 2022-05-30

## 2022-05-30 RX ORDER — METHYLPREDNISOLONE 4 MG/1
TABLET ORAL
Qty: 1 KIT | Refills: 0 | Status: SHIPPED | OUTPATIENT
Start: 2022-05-30 | End: 2022-06-05

## 2022-05-30 RX ORDER — OXYCODONE HYDROCHLORIDE AND ACETAMINOPHEN 5; 325 MG/1; MG/1
1 TABLET ORAL EVERY 6 HOURS PRN
Qty: 20 TABLET | Refills: 0 | Status: SHIPPED | OUTPATIENT
Start: 2022-05-30 | End: 2022-06-04

## 2022-05-30 RX ORDER — CYCLOBENZAPRINE HCL 10 MG
10 TABLET ORAL EVERY 8 HOURS PRN
Qty: 20 TABLET | Refills: 0 | Status: SHIPPED | OUTPATIENT
Start: 2022-05-30

## 2022-05-30 RX ADMIN — METHYLPREDNISOLONE SODIUM SUCCINATE 125 MG: 125 INJECTION, POWDER, FOR SOLUTION INTRAMUSCULAR; INTRAVENOUS at 22:18

## 2022-05-30 ASSESSMENT — ENCOUNTER SYMPTOMS
EYE DISCHARGE: 0
COLOR CHANGE: 0
CONSTIPATION: 0
COUGH: 0
SHORTNESS OF BREATH: 0
FACIAL SWELLING: 0
EYE REDNESS: 0
DIARRHEA: 0
VOMITING: 0
ABDOMINAL PAIN: 0

## 2022-05-30 ASSESSMENT — PAIN SCALES - GENERAL: PAINLEVEL_OUTOF10: 8

## 2022-05-30 ASSESSMENT — PAIN - FUNCTIONAL ASSESSMENT: PAIN_FUNCTIONAL_ASSESSMENT: 0-10

## 2022-05-31 NOTE — ED PROVIDER NOTES
(hypertension)     Psoriasis        SURGICAL HISTORY     No past surgical history on file. FAMILY HISTORY           Problem Relation Age of Onset    Cancer Mother         colon cancer-  56y/o    Diabetes Maternal Grandmother      Family Status   Relation Name Status    Mother  (Not Specified)    MGM  (Not Specified)        SOCIAL HISTORY      reports that he has quit smoking. His smoking use included cigars and cigarettes. He has a 10.00 pack-year smoking history. He has never used smokeless tobacco. He reports current alcohol use. He reports that he does not use drugs. REVIEW OF SYSTEMS    (2-9 systems for level 4, 10 or more for level 5)     Review of Systems   Constitutional: Negative for chills, fatigue and fever. HENT: Negative for congestion, ear discharge and facial swelling. Eyes: Negative for discharge and redness. Respiratory: Negative for cough and shortness of breath. Cardiovascular: Negative for chest pain. Gastrointestinal: Negative for abdominal pain, constipation, diarrhea and vomiting. Genitourinary: Negative for dysuria and hematuria. Musculoskeletal: Negative for arthralgias. Skin: Negative for color change and rash. Neurological: Negative for syncope, numbness and headaches. Hematological: Negative for adenopathy. Psychiatric/Behavioral: Negative for confusion. The patient is not nervous/anxious. Except as noted above the remainder of the review of systems was reviewed and negative. PHYSICAL EXAM    (up to 7 for level 4, 8 or more for level 5)     Vitals:    22 2157   BP: (!) 149/95   Pulse: 91   Resp: 18   Temp: 99.7 °F (37.6 °C)   TempSrc: Oral   SpO2: 98%   Weight: 220 lb (99.8 kg)   Height: 5' 9\" (1.753 m)       Physical Exam  Vitals reviewed. Constitutional:       General: He is not in acute distress. Appearance: He is well-developed. He is not diaphoretic. HENT:      Head: Normocephalic and atraumatic.    Eyes: General: No scleral icterus. Right eye: No discharge. Left eye: No discharge. Cardiovascular:      Rate and Rhythm: Normal rate and regular rhythm. Pulmonary:      Effort: Pulmonary effort is normal. No respiratory distress. Breath sounds: Normal breath sounds. No stridor. No wheezing or rales. Abdominal:      General: There is no distension. Palpations: Abdomen is soft. Tenderness: There is no abdominal tenderness. Musculoskeletal:         General: Normal range of motion. Cervical back: Neck supple. Comments: Back is inspected and there is no bruise or abrasions. No focal area of tenderness. Lower extremity strength intact. Lymphadenopathy:      Cervical: No cervical adenopathy. Skin:     General: Skin is warm and dry. Findings: Rash present. No erythema. Comments: He has mild areas of eczema primarily present on his forearms. Neurological:      Mental Status: He is alert and oriented to person, place, and time. Psychiatric:         Behavior: Behavior normal.             DIAGNOSTIC RESULTS     EKG: All EKG's are interpreted by the Emergency Department Physician who either signs or Co-signs this chart in the absence of a cardiologist.    Not indicated    RADIOLOGY:   Non-plain film images such as CT, Ultrasound and MRI are read by the radiologist. Plain radiographic images are visualized and preliminarily interpreted by the emergency physician with the below findings:    Not indicated    Interpretation per the Radiologist below, if available at the time of this note:        LABS:  Labs Reviewed - No data to display    All other labs were within normal range or not returned as of this dictation.     EMERGENCY DEPARTMENT COURSE and DIFFERENTIAL DIAGNOSIS/MDM:   Vitals:    Vitals:    05/30/22 2157   BP: (!) 149/95   Pulse: 91   Resp: 18   Temp: 99.7 °F (37.6 °C)   TempSrc: Oral   SpO2: 98%   Weight: 220 lb (99.8 kg)   Height: 5' 9\" (1.753 m) Orders Placed This Encounter   Medications    methylPREDNISolone sodium (SOLU-MEDROL) injection 125 mg    oxyCODONE-acetaminophen (PERCOCET) 5-325 MG per tablet     Sig: Take 1 tablet by mouth every 6 hours as needed for Pain for up to 5 days. Dispense:  20 tablet     Refill:  0    methylPREDNISolone (MEDROL, TRISTEN,) 4 MG tablet     Sig: As Directed     Dispense:  1 kit     Refill:  0    cyclobenzaprine (FLEXERIL) 10 mg tablet     Sig: Take 1 tablet by mouth every 8 hours as needed for Muscle spasms     Dispense:  20 tablet     Refill:  0       Medical Decision Making: He will be treated with appropriate medications. Treatment diagnosis and follow-up were discussed with the patient. CONSULTS:  None    PROCEDURES:  None    FINAL IMPRESSION      1. Eczema, unspecified type    2. Bilateral low back pain without sciatica, unspecified chronicity          DISPOSITION/PLAN   DISPOSITION Decision To Discharge 05/30/2022 10:05:48 PM      PATIENT REFERRED TO:   Luis Gallego DO  [address] [facility], [address]  [place] [phone]      If symptoms worsen    [facility]  [address] [facility]  [phone]          DISCHARGE MEDICATIONS:     New Prescriptions    CYCLOBENZAPRINE (FLEXERIL) 10 MG TABLET    Take 1 tablet by mouth every 8 hours as needed for Muscle spasms    METHYLPREDNISOLONE (MEDROL, TRISTEN,) 4 MG TABLET    As Directed    OXYCODONE-ACETAMINOPHEN (PERCOCET) 5-325 MG PER TABLET    Take 1 tablet by mouth every 6 hours as needed for Pain for up to 5 days. The care is provided during an unprecedented national emergency due to the novel coronavirus, COVID-19.     (Please note that portions of this note were completed with a voice recognition program.  Efforts were made to edit the dictations but occasionally words are mis-transcribed.)    Anna Morton MD  Attending Emergency Physician           Anna Morton MD  05/30/22 3056

## 2023-01-13 ENCOUNTER — HOSPITAL ENCOUNTER (EMERGENCY)
Age: 43
Discharge: HOME | End: 2023-01-13
Attending: EMERGENCY MEDICINE
Payer: COMMERCIAL

## 2023-01-13 VITALS
WEIGHT: 215 LBS | RESPIRATION RATE: 16 BRPM | TEMPERATURE: 97.8 F | HEART RATE: 77 BPM | OXYGEN SATURATION: 100 % | SYSTOLIC BLOOD PRESSURE: 141 MMHG | BODY MASS INDEX: 31.75 KG/M2 | DIASTOLIC BLOOD PRESSURE: 97 MMHG

## 2023-01-13 DIAGNOSIS — M54.31 SCIATICA OF RIGHT SIDE: Primary | ICD-10-CM

## 2023-01-13 PROCEDURE — 99284 EMERGENCY DEPT VISIT MOD MDM: CPT

## 2023-01-13 PROCEDURE — 6360000002 HC RX W HCPCS: Performed by: EMERGENCY MEDICINE

## 2023-01-13 PROCEDURE — 96372 THER/PROPH/DIAG INJ SC/IM: CPT

## 2023-01-13 PROCEDURE — 6370000000 HC RX 637 (ALT 250 FOR IP): Performed by: EMERGENCY MEDICINE

## 2023-01-13 RX ORDER — CYCLOBENZAPRINE HCL 10 MG
10 TABLET ORAL 3 TIMES DAILY PRN
Qty: 21 TABLET | Refills: 0 | Status: SHIPPED | OUTPATIENT
Start: 2023-01-13 | End: 2023-01-23

## 2023-01-13 RX ORDER — PREDNISONE 20 MG/1
20 TABLET ORAL DAILY
Qty: 5 TABLET | Refills: 0 | Status: SHIPPED | OUTPATIENT
Start: 2023-01-13 | End: 2023-01-18

## 2023-01-13 RX ORDER — CYCLOBENZAPRINE HCL 10 MG
10 TABLET ORAL ONCE
Status: COMPLETED | OUTPATIENT
Start: 2023-01-13 | End: 2023-01-13

## 2023-01-13 RX ORDER — DEXAMETHASONE SODIUM PHOSPHATE 10 MG/ML
10 INJECTION, SOLUTION INTRAMUSCULAR; INTRAVENOUS ONCE
Status: COMPLETED | OUTPATIENT
Start: 2023-01-13 | End: 2023-01-13

## 2023-01-13 RX ORDER — KETOROLAC TROMETHAMINE 30 MG/ML
30 INJECTION, SOLUTION INTRAMUSCULAR; INTRAVENOUS ONCE
Status: COMPLETED | OUTPATIENT
Start: 2023-01-13 | End: 2023-01-13

## 2023-01-13 RX ADMIN — KETOROLAC TROMETHAMINE 30 MG: 30 INJECTION, SOLUTION INTRAMUSCULAR; INTRAVENOUS at 14:41

## 2023-01-13 RX ADMIN — DEXAMETHASONE SODIUM PHOSPHATE 10 MG: 10 INJECTION INTRAMUSCULAR; INTRAVENOUS at 14:41

## 2023-01-13 RX ADMIN — CYCLOBENZAPRINE 10 MG: 10 TABLET, FILM COATED ORAL at 14:40

## 2023-01-13 ASSESSMENT — ENCOUNTER SYMPTOMS
PHOTOPHOBIA: 0
NAUSEA: 0
ABDOMINAL PAIN: 0
COLOR CHANGE: 0
COUGH: 0
SHORTNESS OF BREATH: 0
VOMITING: 0
BACK PAIN: 1
DIARRHEA: 0
TROUBLE SWALLOWING: 0
BOWEL INCONTINENCE: 0

## 2023-01-13 ASSESSMENT — PAIN DESCRIPTION - FREQUENCY: FREQUENCY: CONTINUOUS

## 2023-01-13 ASSESSMENT — PAIN SCALES - GENERAL
PAINLEVEL_OUTOF10: 8
PAINLEVEL_OUTOF10: 8

## 2023-01-13 ASSESSMENT — PAIN - FUNCTIONAL ASSESSMENT: PAIN_FUNCTIONAL_ASSESSMENT: 0-10

## 2023-01-13 ASSESSMENT — PAIN DESCRIPTION - LOCATION: LOCATION: BUTTOCKS;LEG

## 2023-01-13 ASSESSMENT — PAIN DESCRIPTION - DESCRIPTORS: DESCRIPTORS: BURNING;SHARP

## 2023-01-13 ASSESSMENT — PAIN DESCRIPTION - ORIENTATION: ORIENTATION: RIGHT

## 2023-01-13 NOTE — LETTER
[facility] ED  [address] [place] [address]  Phone: [phone]             January 13, 2023    Patient: Jillian Dickson   YOB: 1980   Date of Visit: 1/13/2023       To Whom It May Concern:    Jillian Dickson was seen and treated in our emergency department on 1/13/2023. He may return to school on 01/14/2023.       Sincerely,             Signature:__________________________________

## 2023-01-13 NOTE — ED NOTES
Patient has right low back and leg pain, sudden onset last night, no accident, injury or trauma, patient has a history of sciatica.       Nikos Meraz, RN  16/74/55 5654

## 2023-01-13 NOTE — ED PROVIDER NOTES
EMERGENCY DEPARTMENT ENCOUNTER    Pt Name: Ghada Stubbs  MRN: [id number]  [place] 1980  Date of evaluation: 1/13/23  CHIEF COMPLAINT       Chief Complaint   Patient presents with    Buttocks Pain     Right onset yesterday    Leg Pain     right     HISTORY OF PRESENT ILLNESS   41-year-old male presenting to the ER complaining of right lower back pain that radiates down the right lower extremity. Patient does have a recurrent history of sciatica. Patient states this episode started yesterday. Patient is a  for work. Patient states he he does not remember a specific movement that started the pain yesterday. The history is provided by the patient. Back Pain  Location:  Lumbar spine  Quality:  Aching  Radiates to:  R thigh  Onset quality:  Gradual  Duration:  1 day  Timing:  Constant  Chronicity:  New  Relieved by:  None tried  Worsened by: Movement  Ineffective treatments:  None tried  Associated symptoms: no abdominal pain, no bladder incontinence, no bowel incontinence, no chest pain, no dysuria, no fever and no perianal numbness          REVIEW OF SYSTEMS     Review of Systems   Constitutional:  Negative for activity change, fatigue and fever. HENT:  Negative for congestion, ear pain and trouble swallowing. Eyes:  Negative for photophobia and visual disturbance. Respiratory:  Negative for cough and shortness of breath. Cardiovascular:  Negative for chest pain and palpitations. Gastrointestinal:  Negative for abdominal pain, bowel incontinence, diarrhea, nausea and vomiting. Genitourinary:  Negative for bladder incontinence, dysuria, flank pain and urgency. Musculoskeletal:  Positive for back pain. Negative for arthralgias and myalgias. Skin:  Negative for color change and rash. Neurological:  Negative for dizziness and facial asymmetry. Psychiatric/Behavioral:  Negative for agitation and behavioral problems.     PASTMEDICAL HISTORY     Past Medical History:   Diagnosis Date    Back pain     on 12/24/19 pt denies pain mgmnt    Diverticulitis     Eczema     HTN (hypertension)     Psoriasis      Past Problem List  Patient Active Problem List   Diagnosis Code    Eczema L30.9    Degeneration, intervertebral disc, cervical M50.30    Ready to quit smoking F17.200    Benign essential HTN I10     SURGICAL HISTORY     History reviewed. No pertinent surgical history. CURRENT MEDICATIONS       Previous Medications    ACETAMINOPHEN (APAP EXTRA STRENGTH) 500 MG TABLET    Take 2 tablets by mouth every 6 hours as needed for Pain    AMLODIPINE (NORVASC) 5 MG TABLET    Take 1 tablet by mouth daily    BUTALBITAL-APAP-CAFFEINE (FIORICET) -40 MG CAPS PER CAPSULE    Take 1 capsule by mouth every 6 hours as needed for Headaches    IBUPROFEN (ADVIL;MOTRIN) 200 MG TABLET    Take 200 mg by mouth every 6 hours as needed for Pain    ONDANSETRON (ZOFRAN ODT) 4 MG DISINTEGRATING TABLET    Take 1 tablet by mouth every 8 hours as needed for Nausea    TRIAMCINOLONE (KENALOG) 0.1 % CREAM    Apply topically 2 times daily for 1 week. ALLERGIES     has No Known Allergies. FAMILY HISTORY     He indicated that the status of his mother is unknown. He indicated that the status of his maternal grandmother is unknown. SOCIAL HISTORY       Social History     Tobacco Use    Smoking status: Former     Packs/day: 0.50     Years: 20.00     Pack years: 10.00     Types: Cigars, Cigarettes    Smokeless tobacco: Never   Vaping Use    Vaping Use: Never used   Substance Use Topics    Alcohol use: Yes     Comment: occasionally    Drug use: No     PHYSICAL EXAM     INITIAL VITALS: BP (!) 141/97   Pulse 77   Temp 97.8 °F (36.6 °C) (Oral)   Resp 16   Wt 215 lb (97.5 kg)   SpO2 100%   BMI 31.75 kg/m²    Physical Exam  Constitutional:       General: He is not in acute distress. Appearance: Normal appearance. HENT:      Head: Normocephalic and atraumatic.       Right Ear: External ear normal.      Left Ear: External ear normal.      Nose: Nose normal. No congestion or rhinorrhea. Eyes:      Extraocular Movements: Extraocular movements intact. Pupils: Pupils are equal, round, and reactive to light. Cardiovascular:      Rate and Rhythm: Normal rate and regular rhythm. Pulses: Normal pulses. Heart sounds: Normal heart sounds. Pulmonary:      Effort: Pulmonary effort is normal. No respiratory distress. Breath sounds: Normal breath sounds. Abdominal:      General: Bowel sounds are normal.      Palpations: Abdomen is soft. Musculoskeletal:         General: No deformity. Normal range of motion. Cervical back: Normal range of motion. No rigidity. Skin:     General: Skin is warm and dry. Neurological:      General: No focal deficit present. Mental Status: He is alert and oriented to person, place, and time. Mental status is at baseline. Psychiatric:         Mood and Affect: Mood normal.         Behavior: Behavior normal.       MEDICAL DECISION MAKING / ED COURSE:     Patient with right lower back pain that radiates down the right lower extremity. Patient with recurrent history of sciatica. Patient provided with pain medication as well as steroids in the ER and a muscle relaxer. Patient's symptoms did improve. Patient also provided with a prescription for continued steroid and muscle relaxant use for outpatient use. Patient instructed to follow-up with the primary care physician within 2 days and to return to the ER immediately if symptoms worsen or change. Patient understands and agrees with the plan. CRITICAL CARE:       PROCEDURES:    Procedures      DATA FOR LAB AND RADIOLOGY TESTS ORDERED BELOW ARE REVIEWED BY THE ED CLINICIAN:    RADIOLOGY: All x-rays, CT, MRI, and formal ultrasound images (except ED bedside ultrasound) are read by the radiologist, see reports below, unless otherwise noted in MDM or here. Reports below are reviewed by myself.   No orders to display LABS: Lab orders shown below, the results are reviewed by myself, and all abnormals are listed below. Labs Reviewed - No data to display    Vitals Reviewed:    Vitals:    01/13/23 1324   BP: (!) 141/97   Pulse: 77   Resp: 16   Temp: 97.8 °F (36.6 °C)   TempSrc: Oral   SpO2: 100%   Weight: 215 lb (97.5 kg)     MEDICATIONS GIVEN TO PATIENT THIS ENCOUNTER:  Orders Placed This Encounter   Medications    cyclobenzaprine (FLEXERIL) tablet 10 mg    ketorolac (TORADOL) injection 30 mg    dexamethasone (PF) (DECADRON) injection 10 mg    predniSONE (DELTASONE) 20 MG tablet     Sig: Take 1 tablet by mouth daily for 5 days     Dispense:  5 tablet     Refill:  0    cyclobenzaprine (FLEXERIL) 10 MG tablet     Sig: Take 1 tablet by mouth 3 times daily as needed for Muscle spasms     Dispense:  21 tablet     Refill:  0     DISCHARGE PRESCRIPTIONS:  New Prescriptions    CYCLOBENZAPRINE (FLEXERIL) 10 MG TABLET    Take 1 tablet by mouth 3 times daily as needed for Muscle spasms    PREDNISONE (DELTASONE) 20 MG TABLET    Take 1 tablet by mouth daily for 5 days     PHYSICIAN CONSULTS ORDERED THIS ENCOUNTER:  None  FINAL IMPRESSION      1.  Sciatica of right side          DISPOSITION/PLAN   DISPOSITION Decision To Discharge 01/13/2023 03:22:10 PM      OUTPATIENT FOLLOW UP THE PATIENT:  DO [address]  [address] [facility]  [phone]    Schedule an appointment as soon as possible for a visit in 2 days      [facility] ED  [address] [facility]  [phone]  Go to   As needed, If symptoms worsen    DO Juan Underwood DO  01/13/23 7280

## 2023-06-08 ENCOUNTER — TELEPHONE (OUTPATIENT)
Dept: GASTROENTEROLOGY | Age: 43
End: 2023-06-08

## 2023-06-08 DIAGNOSIS — Z12.11 COLON CANCER SCREENING: Primary | ICD-10-CM

## 2023-06-08 RX ORDER — SOD SULF/POT CHLORIDE/MAG SULF 1.479 G
TABLET ORAL
Qty: 24 TABLET | Refills: 0 | Status: SHIPPED | OUTPATIENT
Start: 2023-06-08

## 2023-06-08 NOTE — TELEPHONE ENCOUNTER
Writer rec'd call from pt asking to schedule colon off referral, writer notes pt is not established with any provider in this practice, per colon screen questionnaire pt can be scheduled for colon. JACOBO Bryant Friday [email or web address] colon screen(new) sutab. Reviewed bowel prep instructions with patient over phone and mailed to home address.

## 2023-06-12 PROBLEM — K57.92 ACUTE DIVERTICULITIS: Status: ACTIVE | Noted: 2023-06-12

## 2023-06-12 PROBLEM — I10 HYPERTENSION: Status: ACTIVE | Noted: 2018-06-20

## 2023-06-12 PROBLEM — F17.200 SMOKES TOBACCO DAILY: Status: ACTIVE | Noted: 2018-06-20

## 2023-08-31 ENCOUNTER — TELEPHONE (OUTPATIENT)
Dept: OTHER | Age: 43
End: 2023-08-31

## 2023-08-31 NOTE — TELEPHONE ENCOUNTER
Call received form patient's wife stating that he is sick and may have COVID. She said that she has been calling all day to cancel the appointment. Dr Colletta Moorman at [facility] 8:30 AM on 9/1/23.

## 2023-10-09 ENCOUNTER — OFFICE VISIT (OUTPATIENT)
Dept: DERMATOLOGY | Age: 43
End: 2023-10-09
Payer: COMMERCIAL

## 2023-10-09 VITALS
OXYGEN SATURATION: 96 % | WEIGHT: 221 LBS | BODY MASS INDEX: 32.73 KG/M2 | SYSTOLIC BLOOD PRESSURE: 113 MMHG | DIASTOLIC BLOOD PRESSURE: 75 MMHG | HEIGHT: 69 IN | HEART RATE: 86 BPM

## 2023-10-09 DIAGNOSIS — L73.9 FOLLICULITIS: ICD-10-CM

## 2023-10-09 DIAGNOSIS — L30.9 ECZEMA, UNSPECIFIED TYPE: Primary | ICD-10-CM

## 2023-10-09 PROCEDURE — 99204 OFFICE O/P NEW MOD 45 MIN: CPT | Performed by: PHYSICIAN ASSISTANT

## 2023-10-09 PROCEDURE — 3074F SYST BP LT 130 MM HG: CPT | Performed by: PHYSICIAN ASSISTANT

## 2023-10-09 PROCEDURE — 3078F DIAST BP <80 MM HG: CPT | Performed by: PHYSICIAN ASSISTANT

## 2023-10-09 RX ORDER — TRIAMCINOLONE ACETONIDE 1 MG/G
OINTMENT TOPICAL
Qty: 454 G | Refills: 2 | Status: SHIPPED | OUTPATIENT
Start: 2023-10-09

## 2023-10-09 RX ORDER — DOXYCYCLINE HYCLATE 100 MG
100 TABLET ORAL 2 TIMES DAILY
Qty: 28 TABLET | Refills: 0 | Status: SHIPPED | OUTPATIENT
Start: 2023-10-09 | End: 2023-10-23

## 2023-10-09 RX ORDER — CHLORHEXIDINE GLUCONATE 4 G/100ML
SOLUTION TOPICAL
Qty: 473 ML | Refills: 5 | Status: SHIPPED | OUTPATIENT
Start: 2023-10-09 | End: 2023-10-23

## 2023-10-09 RX ORDER — TACROLIMUS 1 MG/G
OINTMENT TOPICAL
Qty: 30 G | Refills: 2 | Status: SHIPPED | OUTPATIENT
Start: 2023-10-09

## 2023-10-09 NOTE — PROGRESS NOTES
Dermatology Patient Note  [address]  [address] [place] [address] [address] [address]  Dept: [phone]  Dept Fax: [phone]      VISITDATE: 10/9/2023   REFERRING PROVIDER: No ref. provider found      Marnie Cano is a 37 y.o. male  who presents today in the office for:    New Patient (Pt here for eczema-states throughout whole body. States runs in the family. No prior tx. States outside through all seasons due to job)      HISTORY OF PRESENT ILLNESS:  New patient presents for eczema. He states that it is throughout his whole body. It runs in the family. Pt states that he experiences pruritus all year. He has failed methotrexate in the past.     MEDICAL PROBLEMS:  Patient Active Problem List    Diagnosis Date Noted    Acute diverticulitis 06/12/2023    Eczema 04/19/2021    Degeneration, intervertebral disc, cervical 04/19/2021    Ready to quit smoking 04/19/2021    Benign essential HTN 04/19/2021    Hypertension 06/20/2018    Smokes tobacco daily 06/20/2018       CURRENT MEDICATIONS:   Current Outpatient Medications   Medication Sig Dispense Refill    doxycycline hyclate (VIBRA-TABS) 100 MG tablet Take 1 tablet by mouth 2 times daily for 14 days 28 tablet 0    chlorhexidine (HIBICLENS) 4 % external liquid Apply to wash lower legs daily. 473 mL 5    triamcinolone (KENALOG) 0.1 % ointment Apply to affected areas twice daily, as needed, for itching. Do NOT use on face, groin, or armpits. 454 g 2    tacrolimus (PROTOPIC) 0.1 % ointment Apply to face twice daily, as needed, for itching.  30 g 2    docusate sodium (COLACE, DULCOLAX) 100 MG CAPS Take 100 mg by mouth daily      hydrocortisone 1 % cream Apply topically 2 times daily as needed (to eczema)      SUTAB 2121-530-721 MG TABS Please follow instructions given to you by your provider 24 tablet 0    ibuprofen (ADVIL;MOTRIN) 200 MG tablet Take 4 tablets by mouth 2 times daily as

## 2023-11-25 VITALS
OXYGEN SATURATION: 97 % | TEMPERATURE: 97.6 F | SYSTOLIC BLOOD PRESSURE: 187 MMHG | HEART RATE: 96 BPM | HEIGHT: 69 IN | BODY MASS INDEX: 32.58 KG/M2 | WEIGHT: 220 LBS | DIASTOLIC BLOOD PRESSURE: 110 MMHG | RESPIRATION RATE: 18 BRPM

## 2023-11-26 ENCOUNTER — HOSPITAL ENCOUNTER (EMERGENCY)
Age: 43
Discharge: HOME | End: 2023-11-26
Attending: EMERGENCY MEDICINE
Payer: COMMERCIAL

## 2023-11-26 ENCOUNTER — HOSPITAL ENCOUNTER (EMERGENCY)
Age: 43
Discharge: LEFT BEFORE DISCHARGE | End: 2023-11-26

## 2023-11-26 VITALS
SYSTOLIC BLOOD PRESSURE: 160 MMHG | OXYGEN SATURATION: 98 % | RESPIRATION RATE: 18 BRPM | DIASTOLIC BLOOD PRESSURE: 100 MMHG | HEART RATE: 89 BPM | TEMPERATURE: 97.9 F

## 2023-11-26 DIAGNOSIS — S39.012A BACK STRAIN, INITIAL ENCOUNTER: Primary | ICD-10-CM

## 2023-11-26 DIAGNOSIS — M62.838 SPASM OF MUSCLE: ICD-10-CM

## 2023-11-26 PROCEDURE — 96372 THER/PROPH/DIAG INJ SC/IM: CPT

## 2023-11-26 PROCEDURE — 6360000002 HC RX W HCPCS

## 2023-11-26 PROCEDURE — 6370000000 HC RX 637 (ALT 250 FOR IP)

## 2023-11-26 PROCEDURE — 99284 EMERGENCY DEPT VISIT MOD MDM: CPT

## 2023-11-26 RX ORDER — PREDNISONE 50 MG/1
50 TABLET ORAL DAILY
Qty: 4 TABLET | Refills: 0 | Status: SHIPPED | OUTPATIENT
Start: 2023-11-26 | End: 2023-11-26

## 2023-11-26 RX ORDER — KETOROLAC TROMETHAMINE 30 MG/ML
30 INJECTION, SOLUTION INTRAMUSCULAR; INTRAVENOUS ONCE
Status: COMPLETED | OUTPATIENT
Start: 2023-11-26 | End: 2023-11-26

## 2023-11-26 RX ORDER — DIAZEPAM 5 MG/1
5 TABLET ORAL EVERY 12 HOURS PRN
Qty: 4 TABLET | Refills: 0 | Status: SHIPPED | OUTPATIENT
Start: 2023-11-26 | End: 2023-11-26

## 2023-11-26 RX ORDER — LIDOCAINE 4 G/G
1 PATCH TOPICAL DAILY
Qty: 30 PATCH | Refills: 0 | Status: SHIPPED | OUTPATIENT
Start: 2023-11-26 | End: 2023-12-26

## 2023-11-26 RX ORDER — LIDOCAINE 4 G/G
1 PATCH TOPICAL ONCE
Status: DISCONTINUED | OUTPATIENT
Start: 2023-11-26 | End: 2023-11-26 | Stop reason: HOSPADM

## 2023-11-26 RX ORDER — LIDOCAINE 4 G/G
1 PATCH TOPICAL DAILY
Qty: 30 PATCH | Refills: 0 | Status: SHIPPED | OUTPATIENT
Start: 2023-11-26 | End: 2023-11-26

## 2023-11-26 RX ORDER — DIAZEPAM 5 MG/1
5 TABLET ORAL EVERY 12 HOURS PRN
Qty: 4 TABLET | Refills: 0 | Status: SHIPPED | OUTPATIENT
Start: 2023-11-26 | End: 2023-11-28

## 2023-11-26 RX ORDER — DEXAMETHASONE SODIUM PHOSPHATE 10 MG/ML
8 INJECTION, SOLUTION INTRAMUSCULAR; INTRAVENOUS ONCE
Status: COMPLETED | OUTPATIENT
Start: 2023-11-26 | End: 2023-11-26

## 2023-11-26 RX ORDER — PREDNISONE 50 MG/1
50 TABLET ORAL DAILY
Qty: 4 TABLET | Refills: 0 | Status: SHIPPED | OUTPATIENT
Start: 2023-11-26 | End: 2023-11-30

## 2023-11-26 RX ADMIN — KETOROLAC TROMETHAMINE 30 MG: 30 INJECTION, SOLUTION INTRAMUSCULAR at 12:14

## 2023-11-26 RX ADMIN — DEXAMETHASONE SODIUM PHOSPHATE 8 MG: 10 INJECTION, SOLUTION INTRAMUSCULAR; INTRAVENOUS at 12:15

## 2023-11-26 ASSESSMENT — ENCOUNTER SYMPTOMS
DIARRHEA: 0
BLOOD IN STOOL: 0
COUGH: 0
CONSTIPATION: 0
SHORTNESS OF BREATH: 0
CHEST TIGHTNESS: 0
NAUSEA: 0
ABDOMINAL PAIN: 0
BACK PAIN: 1
VOMITING: 0

## 2023-11-26 ASSESSMENT — PAIN SCALES - GENERAL: PAINLEVEL_OUTOF10: 9

## 2023-11-26 NOTE — DISCHARGE INSTRUCTIONS
Call today or tomorrow to follow up with Kayla Dewey DO or orthopedic surgery for further evaluation. Use an ice pack or bag filled with ice and apply to the injured area 3 - 4 times a day for 15 - 20 minutes each time. If the injury is older than 3 days, then use a heating pad to help relax the muscles in your back. Use ibuprofen or Tylenol (unless prescribed medications that have Tylenol in it) for pain. You can take over the counter Ibuprofen (advil) tablets (4 tablets every 8 hours or 3 tablets every 6 hours or 2 tablets every 4 hours). Purchase lidocaine patches over-the-counter, apply for 12 hours on, 12 hours off. No more than 3 patches at 1 time. Using a heating pad can help relax muscles and lessen the pain of a muscle spasm.  prescription for prednisone, take with Pepcid over-the-counter to reduce GI upset. Take prednisone in the morning to prevent any issues sleeping. You can also take with a small amount of food to reduce GI upset.  prescription for muscle relaxant, do not take with other narcotic containing medications or alcohol. No driving or working on this medication. WARNING:  May cause drowsiness. May impair ability to operate vehicles or machinery. Do not use in combination with alcohol. Wearing a back brace at work while lifting heavy objects can help support your back and prevent further injury. Sandeep' Flexion Exercises     1. Pelvic tilt. Lie on your back with knees bent, feet flat on floor. Flatten the small of your back against the floor, without pushing down with the legs. Hold for 5 to 10 seconds. 2. Single Knee to chest. Lie on your back with knees bent and feet flat on the floor. Slowly pull your right knee toward your shoulder and hold 5 to 10 seconds. Lower the knee and repeat with the other knee. 3. Double knee to chest. Begin as in the previous exercise.  After pulling right knee to chest, pull left knee to chest and

## 2023-11-26 NOTE — ED PROVIDER NOTES
[facility] ED  eMERGENCY dEPARTMENT eNCOUnter      Pt Name: Arian Dinh  MRN: [id number]  [address] [facility] 1980  Date of evaluation: 11/26/2023  Provider: Vincent Rodriguez, GIANNA - [address] [facility]       Chief Complaint   Patient presents with    Back Pain     Pt states he wants a shot and steroid for his back pain. HISTORY OF PRESENT ILLNESS  (Location/Symptom, Timing/Onset, Context/Setting, Quality, Duration, Modifying Factors, Severity.)   Arian Dinh is a 37 y.o. male who presents to the emergency department with complaint of acute exacerbation of his chronic back pain. Patient states he will intermittently get a flareup of his chronic back pain and receives a steroid shot for this. Patient has not been evaluated by orthopedics or spinal surgery for this problem. No injury or trauma, no numbness or tingling, weakness to extremities. No loss of bowel or bladder control. No history of IV drug use, no fever or chills, chest pain or shortness of breath. No pain medication taken prior to arrival, rating his pain an 8 and 10. Nursing Notes were reviewed. ALLERGIES     Patient has no known allergies. CURRENT MEDICATIONS       Discharge Medication List as of 11/26/2023 12:32 PM        CONTINUE these medications which have NOT CHANGED    Details   triamcinolone (KENALOG) 0.1 % ointment Apply to affected areas twice daily, as needed, for itching. Do NOT use on face, groin, or armpits. , Disp-454 g, R-2, Normal      tacrolimus (PROTOPIC) 0.1 % ointment Apply to face twice daily, as needed, for itching., Disp-30 g, R-2, Normal      docusate sodium (COLACE, DULCOLAX) 100 MG CAPS Take 100 mg by mouth dailyOTC      hydrocortisone 1 % cream Apply topically 2 times daily as needed (to eczema), Topical, 2 TIMES DAILY PRN, Historical Med      SUTAB 5830-299-610 MG TABS Please follow instructions given to you by your provider, Disp-24 tablet, R-0, DAWNormal      ibuprofen (ADVIL;MOTRIN)

## 2024-04-03 ENCOUNTER — HOSPITAL ENCOUNTER (EMERGENCY)
Age: 44
Discharge: HOME | End: 2024-04-03
Attending: EMERGENCY MEDICINE
Payer: COMMERCIAL

## 2024-04-03 VITALS
WEIGHT: 220 LBS | BODY MASS INDEX: 32.49 KG/M2 | RESPIRATION RATE: 16 BRPM | SYSTOLIC BLOOD PRESSURE: 160 MMHG | OXYGEN SATURATION: 99 % | HEART RATE: 90 BPM | DIASTOLIC BLOOD PRESSURE: 100 MMHG

## 2024-04-03 DIAGNOSIS — H60.12 CELLULITIS OF LEFT EARLOBE: ICD-10-CM

## 2024-04-03 DIAGNOSIS — H60.502 ACUTE OTITIS EXTERNA OF LEFT EAR, UNSPECIFIED TYPE: ICD-10-CM

## 2024-04-03 DIAGNOSIS — L30.9 ECZEMA, UNSPECIFIED TYPE: Primary | ICD-10-CM

## 2024-04-03 PROCEDURE — 6370000000 HC RX 637 (ALT 250 FOR IP): Performed by: NURSE PRACTITIONER

## 2024-04-03 PROCEDURE — 6360000002 HC RX W HCPCS: Performed by: NURSE PRACTITIONER

## 2024-04-03 PROCEDURE — 99284 EMERGENCY DEPT VISIT MOD MDM: CPT

## 2024-04-03 PROCEDURE — 96372 THER/PROPH/DIAG INJ SC/IM: CPT

## 2024-04-03 RX ORDER — DEXAMETHASONE SODIUM PHOSPHATE 10 MG/ML
10 INJECTION, SOLUTION INTRAMUSCULAR; INTRAVENOUS ONCE
Status: COMPLETED | OUTPATIENT
Start: 2024-04-03 | End: 2024-04-03

## 2024-04-03 RX ORDER — CEPHALEXIN 500 MG/1
500 CAPSULE ORAL ONCE
Status: COMPLETED | OUTPATIENT
Start: 2024-04-03 | End: 2024-04-03

## 2024-04-03 RX ORDER — HYDROXYZINE HYDROCHLORIDE 25 MG/1
25 TABLET, FILM COATED ORAL EVERY 6 HOURS PRN
Qty: 20 TABLET | Refills: 0 | Status: SHIPPED | OUTPATIENT
Start: 2024-04-03 | End: 2024-04-13

## 2024-04-03 RX ORDER — PREDNISONE 10 MG/1
10 TABLET ORAL DAILY
Qty: 27 TABLET | Refills: 0 | Status: SHIPPED | OUTPATIENT
Start: 2024-04-03 | End: 2024-04-13

## 2024-04-03 RX ORDER — CEPHALEXIN 500 MG/1
500 CAPSULE ORAL 4 TIMES DAILY
Qty: 40 CAPSULE | Refills: 0 | Status: SHIPPED | OUTPATIENT
Start: 2024-04-03 | End: 2024-04-13

## 2024-04-03 RX ORDER — NEOMYCIN SULFATE, POLYMYXIN B SULFATE AND HYDROCORTISONE 10; 3.5; 1 MG/ML; MG/ML; [USP'U]/ML
3 SUSPENSION/ DROPS AURICULAR (OTIC) 4 TIMES DAILY
Qty: 10 ML | Refills: 0 | Status: SHIPPED | OUTPATIENT
Start: 2024-04-03 | End: 2024-04-13

## 2024-04-03 RX ADMIN — CEPHALEXIN 500 MG: 500 CAPSULE ORAL at 21:49

## 2024-04-03 RX ADMIN — DEXAMETHASONE SODIUM PHOSPHATE 10 MG: 10 INJECTION, SOLUTION INTRAMUSCULAR; INTRAVENOUS at 21:49

## 2024-04-03 ASSESSMENT — PAIN DESCRIPTION - ORIENTATION: ORIENTATION: LEFT;RIGHT

## 2024-04-03 ASSESSMENT — PAIN SCALES - GENERAL: PAINLEVEL_OUTOF10: 8

## 2024-04-03 ASSESSMENT — ENCOUNTER SYMPTOMS: COLOR CHANGE: 1

## 2024-04-03 ASSESSMENT — PAIN DESCRIPTION - LOCATION: LOCATION: EAR

## 2024-04-03 ASSESSMENT — PAIN - FUNCTIONAL ASSESSMENT: PAIN_FUNCTIONAL_ASSESSMENT: 0-10

## 2024-04-03 ASSESSMENT — LIFESTYLE VARIABLES: HOW OFTEN DO YOU HAVE A DRINK CONTAINING ALCOHOL: NEVER

## 2024-04-04 NOTE — DISCHARGE INSTRUCTIONS
Take medications as prescribed.  Follow-up with your primary care provider.  Turn to emergency department for worsening or new symptoms.

## 2024-04-04 NOTE — DISCHARGE INSTR - COC
Bowel: {YES / NO:}  Bladder: {YES / NO:}  Urinary Catheter: {Urinary Catheter:310335153}   Colostomy/Ileostomy/Ileal Conduit: {YES / NO:}       Date of Last BM: ***  No intake or output data in the 24 hours ending 24  No intake/output data recorded.    Safety Concerns:     { CRYSTAL Safety Concerns:361793610}    Impairments/Disabilities:      { CRYSTAL Impairments/Disabilities:545051032}    Nutrition Therapy:  Current Nutrition Therapy:   { CRYSTAL Diet List:362345253}    Routes of Feeding: {[facility] DME Other Feedings:115591461}  Liquids: {Slp liquid thickness:37655}  Daily Fluid Restriction: {[facility] D[facility] Yes amt example:690308036}  Last Modified Barium Swallow with Video (Video Swallowing Test): {Done Not Done Date:169971207}    Treatments at the Time of Hospital Discharge:   Respiratory Treatments: ***  Oxygen Therapy:  {Therapy; copd oxygen:76500}  Ventilator:    {[facility] Vent List:978189563}    Rehab Therapies: {THERAPEUTIC INTERVENTION:5923369375}  Weight Bearing Status/Restrictions: {[facility] Weight Bearin}  Other Medical Equipment (for information only, NOT a DME order):  {EQUIPMENT:522478886}  Other Treatments: ***    Patient's personal belongings (please select all that are sent with patient):  {[facility] DME Belongings:485645255}    RN SIGNATURE:  {Esignature:881335873}    CASE MANAGEMENT/SOCIAL WORK SECTION    Inpatient Status Date: ***    Readmission Risk Assessment Score:  Readmission Risk              Risk of Unplanned Readmission:  0           Discharging to Facility/ Agency   Name:   Address:  Phone:  Fax:    Dialysis Facility (if applicable)   Name:  Address:  Dialysis Schedule:  Phone:  Fax:    / signature: {Esignature:935542540}    PHYSICIAN SECTION    Prognosis: {Prognosis:1756122788}    Condition at Discharge: { Patient Condition:200899216}    Rehab Potential (if transferring to Rehab): {Prognosis:9763596319}    Recommended Labs or Other Treatments After

## 2024-04-04 NOTE — ED PROVIDER NOTES
eMERGENCY dEPARTMENT eNCOUnter   Independent Attestation     Pt Name: Dinesh Mccray  MRN: [id number]  Birthdate 1980  Date of evaluation: 4/3/24     Dinesh Mccray is a 43 y.o. male with CC: Rash (Eczema)      Based on the medical record the care appears appropriate.  I was personally available for consultation in the Emergency Department.    Juan Longoria DO  Attending Emergency Physician                  Juan Longoria DO  04/04/24 0144    
Efforts were made to edit the dictations but occasionally words are mis-transcribed.)    GIANNA Alfredo - Alvaro Galarza APRN - CNP  04/03/24 9008

## 2024-05-11 ENCOUNTER — HOSPITAL ENCOUNTER (EMERGENCY)
Age: 44
Discharge: HOME | End: 2024-05-11
Attending: EMERGENCY MEDICINE
Payer: COMMERCIAL

## 2024-05-11 VITALS
DIASTOLIC BLOOD PRESSURE: 99 MMHG | OXYGEN SATURATION: 94 % | RESPIRATION RATE: 14 BRPM | SYSTOLIC BLOOD PRESSURE: 139 MMHG | TEMPERATURE: 97.3 F | HEART RATE: 90 BPM

## 2024-05-11 DIAGNOSIS — L30.9 ECZEMA, UNSPECIFIED TYPE: Primary | ICD-10-CM

## 2024-05-11 PROCEDURE — 99283 EMERGENCY DEPT VISIT LOW MDM: CPT

## 2024-05-11 RX ORDER — PREDNISONE 10 MG/1
TABLET ORAL
Qty: 30 TABLET | Refills: 0 | Status: SHIPPED | OUTPATIENT
Start: 2024-05-11 | End: 2024-05-23

## 2024-05-11 RX ORDER — CIPROFLOXACIN AND DEXAMETHASONE 3; 1 MG/ML; MG/ML
4 SUSPENSION/ DROPS AURICULAR (OTIC) 2 TIMES DAILY
Qty: 7.5 ML | Refills: 0 | Status: SHIPPED | OUTPATIENT
Start: 2024-05-11 | End: 2024-05-18

## 2024-05-11 ASSESSMENT — ENCOUNTER SYMPTOMS: FACIAL SWELLING: 0

## 2024-05-11 NOTE — ED PROVIDER NOTES
[facility] ED     Emergency Department     Faculty Attestation    I performed a history and physical examination of the patient and discussed management with the resident. I reviewed the resident’s note and agree with the documented findings and plan of care. Any areas of disagreement are noted on the chart. I was personally present for the key portions of any procedures. I have documented in the chart those procedures where I was not present during the key portions. I have reviewed the emergency nurses triage note. I agree with the chief complaint, past medical history, past surgical history, allergies, medications, social and family history as documented unless otherwise noted below. For Physician Assistant/ Nurse Practitioner cases/documentation I have personally evaluated this patient and have completed at least one if not all key elements of the E/M (history, physical exam, and MDM). Additional findings are as noted.    10:33 AM EDT    Patient presents with eczematous rash to his bilateral ears.  Patient says he has had this in the past.  He also is complaining of some drainage from the ears and ear pain.  Will treat with topical steroid as well as antibiotic otic drops.      Tabitha Bell MD  Attending Emergency  Physician

## 2024-05-11 NOTE — DISCHARGE INSTRUCTIONS
Thank you for coming to [facility]'s emergency department!    You were seen today for eczema    You were prescribed prednisone and ciprodex ear drops, please pick these medications up at the pharmacy. Follow up with your primary care doctor.  If you have any worsening of symptoms or any other concerns, please return to the emergency department.  We are always available!

## 2024-05-11 NOTE — ED PROVIDER NOTES
[facility] ED  Emergency Department Encounter  Emergency Medicine Resident     Pt Name:Dinesh Mccray  MRN: [id number]  Birthdate 1980  Date of evaluation: 5/11/24  PCP:  Timmy Dewey DO  Note Started: 12:27 PM EDT      CHIEF COMPLAINT       Chief Complaint   Patient presents with    Rash       HISTORY OF PRESENT ILLNESS  (Location/Symptom, Timing/Onset, Context/Setting, Quality, Duration, Modifying Factors, Severity.)      Dinesh Mccray is a 43 y.o. male who presents with eczema to bilateral ears which has been worsening over the last few days.  Patient started noticing drainage from bilateral ears and feeling of throat closing up a little bit.  He states that in the past he has required oral steroids for this.  He does have a history of eczema and has been taking his topical steroids but they do not seem to be helping.    PAST MEDICAL / SURGICAL / SOCIAL / FAMILY HISTORY      has a past medical history of Back pain, Diverticulitis, Eczema, HTN (hypertension), and Psoriasis.       has no past surgical history on file.  No psh    Social History     Socioeconomic History    Marital status:      Spouse name: Not on file    Number of children: Not on file    Years of education: Not on file    Highest education level: Not on file   Occupational History    Not on file   Tobacco Use    Smoking status: Former     Current packs/day: 0.50     Average packs/day: 0.5 packs/day for 20.0 years (10.0 ttl pk-yrs)     Types: Cigars, Cigarettes    Smokeless tobacco: Never   Vaping Use    Vaping Use: Never used   Substance and Sexual Activity    Alcohol use: Yes     Comment: occasionally    Drug use: No    Sexual activity: Yes     Partners: Female   Other Topics Concern    Not on file   Social History Narrative    Not on file     Social Determinants of Health     Financial Resource Strain: Low Risk  (4/19/2021)    Overall Financial Resource Strain (CARDIA)     Difficulty of Paying Living  Expenses: Not hard at all   Food Insecurity: No Food Insecurity (2021)    Hunger Vital Sign     Worried About Running Out of Food in the Last Year: Never true     Ran Out of Food in the Last Year: Never true   Transportation Needs: No Transportation Needs (2021)    PRAPARE - Transportation     Lack of Transportation (Medical): No     Lack of Transportation (Non-Medical): No   Physical Activity: Not on file   Stress: Not on file   Social Connections: Not on file   Intimate Partner Violence: Not on file   Housing Stability: Not on file       Family History   Problem Relation Age of Onset    Cancer Mother         colon cancer-  54y/o    No Known Problems Father     Diabetes Maternal Grandmother        Allergies:  Patient has no known allergies.    Home Medications:  Prior to Admission medications    Medication Sig Start Date End Date Taking? Authorizing Provider   predniSONE (DELTASONE) 10 MG tablet Take 4 tablets by mouth daily for 3 days, THEN 3 tablets daily for 3 days, THEN 2 tablets daily for 3 days, THEN 1 tablet daily for 3 days. 24 Yes Kandis Monsivais DO   ciprofloxacin-dexAMETHasone (CIPRODEX) 0.3-0.1 % otic suspension Place 4 drops into both ears 2 times daily for 7 days 24 Yes Kandis Monsivais DO   triamcinolone (KENALOG) 0.1 % ointment Apply to affected areas twice daily, as needed, for itching.  Do NOT use on face, groin, or armpits. 10/9/23   Tex Heredia PA-C   tacrolimus (PROTOPIC) 0.1 % ointment Apply to face twice daily, as needed, for itching. 10/9/23   Tex Heredia PA-C   docusate sodium (COLACE, DULCOLAX) 100 MG CAPS Take 100 mg by mouth daily 23   OrCleveland colon, DO   hydrocortisone 1 % cream Apply topically 2 times daily as needed (to eczema)    Provider, MD LANA Ng [phone] MG TABS Please follow instructions given to you by your provider 23   Ezequiel Bryant MD   ibuprofen (ADVIL;MOTRIN) 200 MG tablet Take 4 tablets by mouth 2

## 2024-07-19 ENCOUNTER — HOSPITAL ENCOUNTER (EMERGENCY)
Age: 44
Discharge: HOME | End: 2024-07-19
Attending: EMERGENCY MEDICINE
Payer: COMMERCIAL

## 2024-07-19 VITALS
HEIGHT: 69 IN | HEART RATE: 94 BPM | WEIGHT: 220 LBS | SYSTOLIC BLOOD PRESSURE: 175 MMHG | RESPIRATION RATE: 18 BRPM | BODY MASS INDEX: 32.58 KG/M2 | DIASTOLIC BLOOD PRESSURE: 96 MMHG | OXYGEN SATURATION: 97 % | TEMPERATURE: 98.2 F

## 2024-07-19 DIAGNOSIS — L30.9 ECZEMA, UNSPECIFIED TYPE: Primary | ICD-10-CM

## 2024-07-19 PROCEDURE — 99284 EMERGENCY DEPT VISIT MOD MDM: CPT

## 2024-07-19 PROCEDURE — 6370000000 HC RX 637 (ALT 250 FOR IP): Performed by: EMERGENCY MEDICINE

## 2024-07-19 PROCEDURE — 6360000002 HC RX W HCPCS: Performed by: EMERGENCY MEDICINE

## 2024-07-19 PROCEDURE — 2580000003 HC RX 258: Performed by: EMERGENCY MEDICINE

## 2024-07-19 PROCEDURE — 96372 THER/PROPH/DIAG INJ SC/IM: CPT

## 2024-07-19 RX ORDER — CEPHALEXIN 500 MG/1
500 CAPSULE ORAL ONCE
Status: COMPLETED | OUTPATIENT
Start: 2024-07-19 | End: 2024-07-19

## 2024-07-19 RX ORDER — CEPHALEXIN 500 MG/1
500 CAPSULE ORAL 2 TIMES DAILY
Qty: 14 CAPSULE | Refills: 0 | Status: SHIPPED | OUTPATIENT
Start: 2024-07-19 | End: 2024-07-19

## 2024-07-19 RX ORDER — PREDNISONE 20 MG/1
20 TABLET ORAL DAILY
Qty: 18 TABLET | Refills: 0 | Status: SHIPPED | OUTPATIENT
Start: 2024-07-19 | End: 2024-07-28

## 2024-07-19 RX ORDER — CEPHALEXIN 500 MG/1
500 CAPSULE ORAL 2 TIMES DAILY
Qty: 14 CAPSULE | Refills: 0 | Status: SHIPPED | OUTPATIENT
Start: 2024-07-19 | End: 2024-07-26

## 2024-07-19 RX ORDER — DIPHENHYDRAMINE HYDROCHLORIDE 50 MG/ML
25 INJECTION INTRAMUSCULAR; INTRAVENOUS ONCE
Status: COMPLETED | OUTPATIENT
Start: 2024-07-19 | End: 2024-07-19

## 2024-07-19 RX ORDER — PREDNISONE 20 MG/1
20 TABLET ORAL DAILY
Qty: 18 TABLET | Refills: 0 | Status: SHIPPED | OUTPATIENT
Start: 2024-07-19 | End: 2024-07-19

## 2024-07-19 RX ADMIN — CEPHALEXIN 500 MG: 500 CAPSULE ORAL at 05:15

## 2024-07-19 RX ADMIN — WATER 125 MG: 1 INJECTION INTRAMUSCULAR; INTRAVENOUS; SUBCUTANEOUS at 05:15

## 2024-07-19 RX ADMIN — DIPHENHYDRAMINE HYDROCHLORIDE 25 MG: 50 INJECTION INTRAMUSCULAR; INTRAVENOUS at 05:15

## 2024-07-19 NOTE — DISCHARGE INSTRUCTIONS
Take the course of prednisone as prescribed.  Take Keflex twice a day for 7 days.  Follow-up with the dermatologist.  If you have any concerning symptoms come back to the ER.

## 2024-07-19 NOTE — ED PROVIDER NOTES
EMERGENCY DEPARTMENT ENCOUNTER    Pt Name: Dinesh Mccray  MRN: [id number]  Birthdate 1980  Date of evaluation: 7/19/24  CHIEF COMPLAINT       Chief Complaint   Patient presents with    Eczema     HISTORY OF PRESENT ILLNESS   HPI  43M hx of eczema and psoriasis presents to the ED for eczema flare. Pt states that it has been much worse than usual for the past few weeks, his steroid cream is not helping, he states that typically when it gets this bad he needs oral steroids. Pt also notes that the skin dryness in his R ear has started causing redness and drainage, he is concerned about infection. No other acute complaints.     REVIEW OF SYSTEMS     Review of Systems   All other systems reviewed and are negative.    PASTMEDICAL HISTORY     Past Medical History:   Diagnosis Date    Back pain     on 12/24/19 pt denies pain mgmnt    Diverticulitis     Eczema     HTN (hypertension)     Psoriasis      SURGICAL HISTORY     History reviewed. No pertinent surgical history.  CURRENT MEDICATIONS       Discharge Medication List as of 7/19/2024  5:23 AM        CONTINUE these medications which have NOT CHANGED    Details   triamcinolone (KENALOG) 0.1 % ointment Apply to affected areas twice daily, as needed, for itching.  Do NOT use on face, groin, or armpits., Disp-454 g, R-2, Normal      tacrolimus (PROTOPIC) 0.1 % ointment Apply to face twice daily, as needed, for itching., Disp-30 g, R-2, Normal      docusate sodium (COLACE, DULCOLAX) 100 MG CAPS Take 100 mg by mouth dailyOTC      hydrocortisone 1 % cream Apply topically 2 times daily as needed (to eczema), Topical, 2 TIMES DAILY PRN, Historical Med      SUTAB [id number] MG TABS Please follow instructions given to you by your provider, Disp-24 tablet, R-0, DAWNormal      ibuprofen (ADVIL;MOTRIN) 200 MG tablet Take 4 tablets by mouth 2 times daily as needed for PainHistorical Med      acetaminophen (APAP EXTRA STRENGTH) 500 MG tablet Take 2 tablets by mouth every 6 hours as

## 2024-08-14 ENCOUNTER — HOSPITAL ENCOUNTER (EMERGENCY)
Age: 44
Discharge: HOME | End: 2024-08-14
Attending: EMERGENCY MEDICINE
Payer: COMMERCIAL

## 2024-08-14 VITALS
TEMPERATURE: 97.7 F | HEIGHT: 69 IN | RESPIRATION RATE: 16 BRPM | SYSTOLIC BLOOD PRESSURE: 158 MMHG | HEART RATE: 86 BPM | WEIGHT: 210 LBS | BODY MASS INDEX: 31.1 KG/M2 | OXYGEN SATURATION: 97 % | DIASTOLIC BLOOD PRESSURE: 116 MMHG

## 2024-08-14 DIAGNOSIS — L30.9 ACUTE ECZEMA: Primary | ICD-10-CM

## 2024-08-14 PROCEDURE — 99283 EMERGENCY DEPT VISIT LOW MDM: CPT

## 2024-08-14 RX ORDER — METHYLPREDNISOLONE 4 MG/1
TABLET ORAL
Qty: 1 KIT | Refills: 0 | Status: SHIPPED | OUTPATIENT
Start: 2024-08-14 | End: 2024-08-20

## 2024-08-14 ASSESSMENT — LIFESTYLE VARIABLES
HOW MANY STANDARD DRINKS CONTAINING ALCOHOL DO YOU HAVE ON A TYPICAL DAY: PATIENT DOES NOT DRINK
HOW OFTEN DO YOU HAVE A DRINK CONTAINING ALCOHOL: NEVER

## 2024-08-14 ASSESSMENT — PAIN - FUNCTIONAL ASSESSMENT: PAIN_FUNCTIONAL_ASSESSMENT: NONE - DENIES PAIN

## 2024-08-14 NOTE — ED TRIAGE NOTES
Mode of arrival (squad #, walk in, police, etc) : Walk-in        Chief complaint(s): Eczema         Arrival Note (brief scenario, treatment PTA, etc).: Pt. Arrived to ER w c/o eczema flare up. Pt. States been dealing with this his whole life. Last couple of days has gotten worse. Pt. States areas on arms/legs/ears and around eyes is irritated. Pt. Has tried benadryl and oatmeal baths.        C= \"Have you ever felt that you should Cut down on your drinking?\"  No  A= \"Have people Annoyed you by criticizing your drinking?\"  No  G= \"Have you ever felt bad or Guilty about your drinking?\"  No  E= \"Have you ever had a drink as an Eye-opener first thing in the morning to steady your nerves or to help a hangover?\"  No      Deferred []      Reason for deferring: N/A    *If yes to two or more: probable alcohol abuse.*

## 2024-08-14 NOTE — ED PROVIDER NOTES
EMERGENCY DEPARTMENT ENCOUNTER    Pt Name: Dinesh Mccray  MRN: [id number]  Birthdate 1980  Date of evaluation: 8/14/24  CHIEF COMPLAINT       Chief Complaint   Patient presents with    Rash     HISTORY OF PRESENT ILLNESS   HPI  Diffuse rash.  Worse on his ears face around his eyes, also on his leg chest thigh back.  He has a history of chronic eczema ever since he was a child.  He is a .  He is on the elements come out in the sun, sweating.  It has been hot lately.  It flares up his asthma.  He is off today.  Does not need a work note.  He states usually Medrol Dosepak helped him out a lot whenever it gets flared up.  Denies any shortness of breath or swelling in his mouth tongue lips throat.  No shortness of breath.  No fever chill sweats.  No purulent drainage      PASTMEDICAL HISTORY     Past Medical History:   Diagnosis Date    Back pain     on 12/24/19 pt denies pain mgmnt    Diverticulitis     Eczema     HTN (hypertension)     Psoriasis      Past Problem List  Patient Active Problem List   Diagnosis Code    Eczema L30.9    Degeneration, intervertebral disc, cervical M50.30    Ready to quit smoking F17.200    Benign essential HTN I10    Acute diverticulitis K57.92    Hypertension I10    Smokes tobacco daily F17.200     SURGICAL HISTORY     No past surgical history on file.  CURRENT MEDICATIONS       Previous Medications    ACETAMINOPHEN (APAP EXTRA STRENGTH) 500 MG TABLET    Take 2 tablets by mouth every 6 hours as needed for Pain    AMLODIPINE (NORVASC) 5 MG TABLET    Take 1 tablet by mouth daily    DOCUSATE SODIUM (COLACE, DULCOLAX) 100 MG CAPS    Take 100 mg by mouth daily    HYDROCORTISONE 1 % CREAM    Apply topically 2 times daily as needed (to eczema)    IBUPROFEN (ADVIL;MOTRIN) 200 MG TABLET    Take 4 tablets by mouth 2 times daily as needed for Pain    SUTAB [id number] MG TABS    Please follow instructions given to you by your provider    TACROLIMUS (PROTOPIC) 0.1 % OINTMENT    Apply to

## 2024-08-30 ENCOUNTER — OFFICE VISIT (OUTPATIENT)
Dept: DERMATOLOGY | Age: 44
End: 2024-08-30
Payer: COMMERCIAL

## 2024-08-30 VITALS
HEIGHT: 69 IN | OXYGEN SATURATION: 98 % | HEART RATE: 101 BPM | BODY MASS INDEX: 33.47 KG/M2 | DIASTOLIC BLOOD PRESSURE: 98 MMHG | TEMPERATURE: 98.2 F | WEIGHT: 226 LBS | SYSTOLIC BLOOD PRESSURE: 154 MMHG

## 2024-08-30 DIAGNOSIS — L20.84 INTRINSIC ATOPIC DERMATITIS: Primary | ICD-10-CM

## 2024-08-30 PROCEDURE — 99214 OFFICE O/P EST MOD 30 MIN: CPT | Performed by: PHYSICIAN ASSISTANT

## 2024-08-30 PROCEDURE — 3077F SYST BP >= 140 MM HG: CPT | Performed by: PHYSICIAN ASSISTANT

## 2024-08-30 PROCEDURE — 3080F DIAST BP >= 90 MM HG: CPT | Performed by: PHYSICIAN ASSISTANT

## 2024-08-30 RX ORDER — DUPILUMAB 300 MG/2ML
INJECTION, SOLUTION SUBCUTANEOUS
Qty: 4 ML | Refills: 2 | Status: ACTIVE | OUTPATIENT
Start: 2024-08-30

## 2024-08-30 RX ORDER — DUPILUMAB 300 MG/2ML
INJECTION, SOLUTION SUBCUTANEOUS
Qty: 4 ML | Refills: 0 | Status: ACTIVE | OUTPATIENT
Start: 2024-08-30

## 2024-08-30 NOTE — PROGRESS NOTES
Dermatology Patient Note  [facility] DERMATOLOGY  [address]  [address]  [place] [address]  Dept: [phone]  Dept Fax: [phone]      VISITDATE: 8/30/2024   REFERRING PROVIDER: No ref. provider found      Dinesh Mccray is a 43 y.o. male  who presents today in the office for:    Other (Patient presents in the office today as a follow for eczema- patient states he is not doing well at all. Has been to the ER 3 times. Extreme itching all over his body. He is currently using triamcinolone and tacrolimus and these are not working at all. )      HISTORY OF PRESENT ILLNESS:  As above.  Pt has also failed hydrocortisone as well.  Pt has had repeated courses of oral prednisone.      MEDICAL PROBLEMS:  Patient Active Problem List    Diagnosis Date Noted    Acute diverticulitis 06/12/2023    Eczema 04/19/2021    Degeneration, intervertebral disc, cervical 04/19/2021    Ready to quit smoking 04/19/2021    Benign essential HTN 04/19/2021    Hypertension 06/20/2018    Smokes tobacco daily 06/20/2018       CURRENT MEDICATIONS:   Current Outpatient Medications   Medication Sig Dispense Refill    dupilumab (DUPIXENT) 300 MG/2ML SOPN injection Inject 600mg SQ at week 0. Begin Maintenance Dose at week 2 4 mL 0    dupilumab (DUPIXENT) 300 MG/2ML SOPN injection Inject 300mg SQ at every 2 weeks 4 mL 2    triamcinolone (KENALOG) 0.1 % ointment Apply to affected areas twice daily, as needed, for itching.  Do NOT use on face, groin, or armpits. 454 g 2    tacrolimus (PROTOPIC) 0.1 % ointment Apply to face twice daily, as needed, for itching. 30 g 2    docusate sodium (COLACE, DULCOLAX) 100 MG CAPS Take 100 mg by mouth daily      hydrocortisone 1 % cream Apply topically 2 times daily as needed (to eczema)      ibuprofen (ADVIL;MOTRIN) 200 MG tablet Take 4 tablets by mouth 2 times daily as needed for Pain      acetaminophen (APAP EXTRA STRENGTH) 500 MG tablet

## 2024-10-15 ENCOUNTER — HOSPITAL ENCOUNTER (EMERGENCY)
Age: 44
Discharge: HOME | End: 2024-10-16
Payer: COMMERCIAL

## 2024-10-15 ENCOUNTER — TELEPHONE (OUTPATIENT)
Dept: DERMATOLOGY | Age: 44
End: 2024-10-15

## 2024-10-15 VITALS
OXYGEN SATURATION: 98 % | SYSTOLIC BLOOD PRESSURE: 170 MMHG | WEIGHT: 232 LBS | DIASTOLIC BLOOD PRESSURE: 110 MMHG | RESPIRATION RATE: 12 BRPM | TEMPERATURE: 98 F | HEART RATE: 98 BPM | BODY MASS INDEX: 34.26 KG/M2

## 2024-10-15 DIAGNOSIS — H10.13 ACUTE ATOPIC CONJUNCTIVITIS OF BOTH EYES: Primary | ICD-10-CM

## 2024-10-15 DIAGNOSIS — H10.9 BACTERIAL CONJUNCTIVITIS OF BOTH EYES: Primary | ICD-10-CM

## 2024-10-15 DIAGNOSIS — B96.89 BACTERIAL CONJUNCTIVITIS OF BOTH EYES: Primary | ICD-10-CM

## 2024-10-15 PROCEDURE — 6370000000 HC RX 637 (ALT 250 FOR IP)

## 2024-10-15 PROCEDURE — 99283 EMERGENCY DEPT VISIT LOW MDM: CPT

## 2024-10-15 RX ORDER — IBUPROFEN 600 MG/1
600 TABLET, FILM COATED ORAL
Status: COMPLETED | OUTPATIENT
Start: 2024-10-15 | End: 2024-10-15

## 2024-10-15 RX ORDER — TETRACAINE HYDROCHLORIDE 5 MG/ML
1 SOLUTION OPHTHALMIC ONCE
Status: COMPLETED | OUTPATIENT
Start: 2024-10-15 | End: 2024-10-15

## 2024-10-15 RX ADMIN — TETRACAINE HYDROCHLORIDE 1 DROP: 5 SOLUTION OPHTHALMIC at 23:51

## 2024-10-15 RX ADMIN — IBUPROFEN 600 MG: 600 TABLET ORAL at 23:51

## 2024-10-15 RX ADMIN — FLUORESCEIN SODIUM 1 MG: 1 STRIP OPHTHALMIC at 23:51

## 2024-10-15 ASSESSMENT — VISUAL ACUITY
OD: 20/30
OU: 20/40
OS: 20/40

## 2024-10-15 ASSESSMENT — PAIN SCALES - GENERAL
PAINLEVEL_OUTOF10: 8
PAINLEVEL_OUTOF10: 5

## 2024-10-15 ASSESSMENT — PAIN - FUNCTIONAL ASSESSMENT: PAIN_FUNCTIONAL_ASSESSMENT: 0-10

## 2024-10-15 NOTE — TELEPHONE ENCOUNTER
I sent a referral to Dr. Niño.  
Patient phones to say he is experiencing red, painful eyes since his last Dupixent injection last week. He does not have a current ophthalmologist and would like a referral. I advised patient to stop the Dupixent for now until he can be treated. Stated understanding.  
Patient was informed of this message 10/15/2024 @ 1:42 pm    
no

## 2024-10-16 RX ORDER — POLYMYXIN B SULFATE AND TRIMETHOPRIM 1; 10000 MG/ML; [USP'U]/ML
2 SOLUTION OPHTHALMIC EVERY 6 HOURS
Qty: 3 ML | Refills: 0 | Status: SHIPPED | OUTPATIENT
Start: 2024-10-16 | End: 2024-10-23

## 2024-10-16 ASSESSMENT — ENCOUNTER SYMPTOMS
EYE DISCHARGE: 1
PHOTOPHOBIA: 0
EYE PAIN: 1
EYE ITCHING: 0
EYE REDNESS: 1

## 2024-10-16 ASSESSMENT — VISUAL ACUITY: OU: 1

## 2024-10-16 NOTE — ED PROVIDER NOTES
Heart sounds: No murmur heard.  Pulmonary:      Effort: Pulmonary effort is normal.      Breath sounds: No wheezing.   Musculoskeletal:         General: Normal range of motion.      Cervical back: Normal range of motion.   Skin:     General: Skin is warm and dry.      Capillary Refill: Capillary refill takes less than 2 seconds.   Neurological:      General: No focal deficit present.      Mental Status: He is alert and oriented to person, place, and time. Mental status is at baseline.      GCS: GCS eye subscore is 4. GCS verbal subscore is 5. GCS motor subscore is 6.      Cranial Nerves: No cranial nerve deficit.      Sensory: No sensory deficit.      Motor: No weakness.           DDX/DIAGNOSTIC RESULTS / EMERGENCY DEPARTMENT COURSE / MDM     Medical Decision Making  44-year-old male presenting for eye drainage.  Vision grossly intact.  He does have mucus and exudates on bilateral eyes.  Concern for bacterial conjunctivitis.  Staining performed with no evidence of abrasion or uptake.  Will plan for antibiotic eyedrops to treat bacterial conjunctivitis.  Advised to follow-up with ophthalmology in approximately 2 to 3 days if his symptoms do not improve.  Patient is agreeable with this plan.  I did advise him that his symptoms are contagious.    Shared decision performed with patient/family/friends regarding discharge.  They are in agreement at this time for plan for discharge.  They were advised to follow-up with appropriate specialists and PCP in 1 day regarding this visit and diagnosis.  They were given opportunity for questioning and questions were answered to their satisfaction.  Appropriate prescriptions and referrals were sent digitally and/or via discharge paperwork.  Discharged in no distress and alert and oriented per their baseline.  They were advised to return to the emergency department for any new or worsening symptoms or if needing further evaluation.      Risk  Prescription drug

## 2024-12-02 ENCOUNTER — TELEPHONE (OUTPATIENT)
Dept: DERMATOLOGY | Age: 44
End: 2024-12-02

## 2024-12-02 NOTE — TELEPHONE ENCOUNTER
----- Message from Gustavo TAVAREZ sent at 12/2/2024  8:51 AM EST -----  Regarding: Dupixent reauthorization  Good morning,     I have received a fax for this patient's PA reauthorization for DUPIXENT, but upon looking at patient's chart he may have been discontinued on medication due to eye irritation (10.15 encounter)     Please let me know if I should renew PA (if so new order needed)      Thanks  HELDER

## 2025-06-04 NOTE — ED NOTES
Bed: 24  Expected date:   Expected time:   Means of arrival:   Comments:  addie Donis RN  10/22/21 0031 Patient called requesting to speak with a RN-advised we are in between RN's due to recent transition. Will send to covering RN

## 2025-07-26 ENCOUNTER — OFFICE VISIT (OUTPATIENT)
Age: 45
End: 2025-07-26

## 2025-07-26 VITALS
DIASTOLIC BLOOD PRESSURE: 101 MMHG | WEIGHT: 220 LBS | HEIGHT: 69 IN | RESPIRATION RATE: 18 BRPM | OXYGEN SATURATION: 94 % | SYSTOLIC BLOOD PRESSURE: 149 MMHG | HEART RATE: 84 BPM | BODY MASS INDEX: 32.58 KG/M2

## 2025-07-26 DIAGNOSIS — L30.9 ECZEMA, UNSPECIFIED TYPE: Primary | ICD-10-CM

## 2025-07-26 RX ORDER — METHYLPREDNISOLONE 4 MG/1
TABLET ORAL
Qty: 21 TABLET | Refills: 0 | Status: SHIPPED | OUTPATIENT
Start: 2025-07-26

## 2025-07-26 NOTE — PROGRESS NOTES
A/P:  Dinesh Mccray (: 1980) is a 44 y.o. male with The encounter diagnosis was Eczema, unspecified type..    Diagnosis Orders   1. Eczema, unspecified type  methylPREDNISolone (MEDROL DOSEPACK) 4 MG tablet      Recommend following up with the dermatology specialist for a more permanent or chronic treatment.     -time spent with patient:10 MINS    Discharge instructions:   - Drink enough amount of fluids throughout the day to prevent dehydration  - Rest. Advance activities and diet as tolerated   - Take the prescribed medications as instructed.  - Tylenol / Motrin as needed for pain and fever and follow up with your PCP as instructed  Patient was instructed to return for re evaluation or go to the ER if symptoms  worsen, persist, or any other concerns . Patient verbalized understanding       Chief complaint(s): skin irritation (Pt stated he has eczema and would like a steroid. )    Dinesh Mccray (: 1980) is a 44 y.o. male, Established patient, here for evaluation of the following     Patient presents with a flare of eczema to his face near his eyes.  Patient has been diagnosed with this chronically.  Was treated with Dupixent injections however dose did not do well and caused adverse reaction \" almost went blind\".  Patient states usually a Medrol Dosepak worked really well for him.  Patient works outside as a  exposed to environment making the eczema worse.      History provided by:  Patient   used: No         PAST MEDICAL HISTORY    Past Medical History:   Diagnosis Date    Back pain     on 19 pt denies pain mgmnt    Diverticulitis     Eczema     HTN (hypertension)     Psoriasis        SURGICAL HISTORY    History reviewed. No pertinent surgical history.    CURRENT MEDICATIONS    Current Outpatient Rx   Medication Sig Dispense Refill    methylPREDNISolone (MEDROL DOSEPACK) 4 MG tablet Take by mouth. 21 tablet 0    amLODIPine (NORVASC) 5 MG tablet Take 1 tablet